# Patient Record
Sex: FEMALE | Race: ASIAN | NOT HISPANIC OR LATINO | Employment: OTHER | URBAN - METROPOLITAN AREA
[De-identification: names, ages, dates, MRNs, and addresses within clinical notes are randomized per-mention and may not be internally consistent; named-entity substitution may affect disease eponyms.]

---

## 2017-01-04 ENCOUNTER — AMBULATORY - HEALTHEAST (OUTPATIENT)
Dept: FAMILY MEDICINE | Facility: CLINIC | Age: 62
End: 2017-01-04

## 2017-01-04 DIAGNOSIS — E55.9 VITAMIN D DEFICIENCY: ICD-10-CM

## 2017-02-07 ENCOUNTER — COMMUNICATION - HEALTHEAST (OUTPATIENT)
Dept: FAMILY MEDICINE | Facility: CLINIC | Age: 62
End: 2017-02-07

## 2017-02-08 ENCOUNTER — AMBULATORY - HEALTHEAST (OUTPATIENT)
Dept: FAMILY MEDICINE | Facility: CLINIC | Age: 62
End: 2017-02-08

## 2017-02-13 ENCOUNTER — RECORDS - HEALTHEAST (OUTPATIENT)
Dept: ADMINISTRATIVE | Facility: OTHER | Age: 62
End: 2017-02-13

## 2017-02-27 ENCOUNTER — COMMUNICATION - HEALTHEAST (OUTPATIENT)
Dept: FAMILY MEDICINE | Facility: CLINIC | Age: 62
End: 2017-02-27

## 2017-03-14 ENCOUNTER — COMMUNICATION - HEALTHEAST (OUTPATIENT)
Dept: FAMILY MEDICINE | Facility: CLINIC | Age: 62
End: 2017-03-14

## 2017-03-14 DIAGNOSIS — Z00.00 PREVENTATIVE HEALTH CARE: ICD-10-CM

## 2017-09-03 ENCOUNTER — COMMUNICATION - HEALTHEAST (OUTPATIENT)
Dept: FAMILY MEDICINE | Facility: CLINIC | Age: 62
End: 2017-09-03

## 2017-09-03 DIAGNOSIS — E11.9 TYPE 2 DIABETES MELLITUS (H): ICD-10-CM

## 2017-09-05 ENCOUNTER — COMMUNICATION - HEALTHEAST (OUTPATIENT)
Dept: FAMILY MEDICINE | Facility: CLINIC | Age: 62
End: 2017-09-05

## 2017-09-14 ENCOUNTER — COMMUNICATION - HEALTHEAST (OUTPATIENT)
Dept: FAMILY MEDICINE | Facility: CLINIC | Age: 62
End: 2017-09-14

## 2017-09-14 ENCOUNTER — OFFICE VISIT - HEALTHEAST (OUTPATIENT)
Dept: FAMILY MEDICINE | Facility: CLINIC | Age: 62
End: 2017-09-14

## 2017-09-14 ENCOUNTER — AMBULATORY - HEALTHEAST (OUTPATIENT)
Dept: FAMILY MEDICINE | Facility: CLINIC | Age: 62
End: 2017-09-14

## 2017-09-14 DIAGNOSIS — Z79.4 TYPE 2 DIABETES MELLITUS TREATED WITH INSULIN (H): ICD-10-CM

## 2017-09-14 DIAGNOSIS — E11.9 TYPE 2 DIABETES MELLITUS TREATED WITH INSULIN (H): ICD-10-CM

## 2017-09-14 DIAGNOSIS — E55.9 VITAMIN D DEFICIENCY: ICD-10-CM

## 2017-09-14 DIAGNOSIS — Z23 NEED FOR INFLUENZA VACCINATION: ICD-10-CM

## 2017-09-14 DIAGNOSIS — Z00.00 PREVENTATIVE HEALTH CARE: ICD-10-CM

## 2017-09-14 DIAGNOSIS — M54.9 BACK PAIN: ICD-10-CM

## 2017-09-14 DIAGNOSIS — R05.9 COUGH: ICD-10-CM

## 2017-09-14 DIAGNOSIS — E78.5 HYPERLIPIDEMIA, UNSPECIFIED HYPERLIPIDEMIA TYPE: ICD-10-CM

## 2017-09-14 DIAGNOSIS — I83.90 VARICOSE VEIN OF LEG: ICD-10-CM

## 2017-09-14 LAB
CHOLEST SERPL-MCNC: 172 MG/DL
FASTING STATUS PATIENT QL REPORTED: YES
HBA1C MFR BLD: 8.6 % (ref 3.5–6)
HDLC SERPL-MCNC: 46 MG/DL
LDLC SERPL CALC-MCNC: 81 MG/DL
TRIGL SERPL-MCNC: 224 MG/DL

## 2017-09-14 ASSESSMENT — MIFFLIN-ST. JEOR: SCORE: 1112.77

## 2017-09-18 ENCOUNTER — COMMUNICATION - HEALTHEAST (OUTPATIENT)
Dept: FAMILY MEDICINE | Facility: CLINIC | Age: 62
End: 2017-09-18

## 2017-09-20 ENCOUNTER — AMBULATORY - HEALTHEAST (OUTPATIENT)
Dept: FAMILY MEDICINE | Facility: CLINIC | Age: 62
End: 2017-09-20

## 2017-10-11 ENCOUNTER — COMMUNICATION - HEALTHEAST (OUTPATIENT)
Dept: FAMILY MEDICINE | Facility: CLINIC | Age: 62
End: 2017-10-11

## 2017-10-11 DIAGNOSIS — R05.9 COUGH: ICD-10-CM

## 2017-10-25 ENCOUNTER — COMMUNICATION - HEALTHEAST (OUTPATIENT)
Dept: FAMILY MEDICINE | Facility: CLINIC | Age: 62
End: 2017-10-25

## 2017-10-25 DIAGNOSIS — E11.9 TYPE 2 DIABETES MELLITUS (H): ICD-10-CM

## 2017-10-26 RX ORDER — BLOOD-GLUCOSE METER
EACH MISCELLANEOUS
Qty: 1 EACH | Refills: 0 | Status: SHIPPED | OUTPATIENT
Start: 2017-10-26

## 2017-12-09 ENCOUNTER — COMMUNICATION - HEALTHEAST (OUTPATIENT)
Dept: FAMILY MEDICINE | Facility: CLINIC | Age: 62
End: 2017-12-09

## 2017-12-11 ENCOUNTER — AMBULATORY - HEALTHEAST (OUTPATIENT)
Dept: FAMILY MEDICINE | Facility: CLINIC | Age: 62
End: 2017-12-11

## 2017-12-11 DIAGNOSIS — R05.9 COUGH: ICD-10-CM

## 2018-03-15 ENCOUNTER — RECORDS - HEALTHEAST (OUTPATIENT)
Dept: ADMINISTRATIVE | Facility: OTHER | Age: 63
End: 2018-03-15

## 2018-03-15 ENCOUNTER — OFFICE VISIT - HEALTHEAST (OUTPATIENT)
Dept: FAMILY MEDICINE | Facility: CLINIC | Age: 63
End: 2018-03-15

## 2018-03-15 DIAGNOSIS — E55.9 VITAMIN D DEFICIENCY: ICD-10-CM

## 2018-03-15 DIAGNOSIS — R05.9 COUGH: ICD-10-CM

## 2018-03-15 DIAGNOSIS — E11.9 TYPE 2 DIABETES MELLITUS TREATED WITH INSULIN (H): ICD-10-CM

## 2018-03-15 DIAGNOSIS — Z79.4 TYPE 2 DIABETES MELLITUS TREATED WITH INSULIN (H): ICD-10-CM

## 2018-03-15 DIAGNOSIS — E78.5 HYPERLIPIDEMIA, UNSPECIFIED HYPERLIPIDEMIA TYPE: ICD-10-CM

## 2018-03-15 LAB
ALBUMIN SERPL-MCNC: 4 G/DL (ref 3.5–5)
ALP SERPL-CCNC: 107 U/L (ref 45–120)
ALT SERPL W P-5'-P-CCNC: 19 U/L (ref 0–45)
ANION GAP SERPL CALCULATED.3IONS-SCNC: 10 MMOL/L (ref 5–18)
AST SERPL W P-5'-P-CCNC: 26 U/L (ref 0–40)
BILIRUB SERPL-MCNC: 0.8 MG/DL (ref 0–1)
BUN SERPL-MCNC: 11 MG/DL (ref 8–22)
CALCIUM SERPL-MCNC: 9.6 MG/DL (ref 8.5–10.5)
CHLORIDE BLD-SCNC: 104 MMOL/L (ref 98–107)
CHOLEST SERPL-MCNC: 174 MG/DL
CO2 SERPL-SCNC: 27 MMOL/L (ref 22–31)
CREAT SERPL-MCNC: 0.58 MG/DL (ref 0.6–1.1)
FASTING STATUS PATIENT QL REPORTED: YES
GFR SERPL CREATININE-BSD FRML MDRD: >60 ML/MIN/1.73M2
GLUCOSE BLD-MCNC: 142 MG/DL (ref 70–125)
HBA1C MFR BLD: 8.1 % (ref 3.5–6)
HDLC SERPL-MCNC: 50 MG/DL
LDLC SERPL CALC-MCNC: 76 MG/DL
POTASSIUM BLD-SCNC: 4.6 MMOL/L (ref 3.5–5)
PROT SERPL-MCNC: 7.7 G/DL (ref 6–8)
SODIUM SERPL-SCNC: 141 MMOL/L (ref 136–145)
TRIGL SERPL-MCNC: 240 MG/DL

## 2018-03-15 ASSESSMENT — MIFFLIN-ST. JEOR: SCORE: 1103.7

## 2018-03-16 LAB — 25(OH)D3 SERPL-MCNC: 28.8 NG/ML (ref 30–80)

## 2018-03-28 ENCOUNTER — COMMUNICATION - HEALTHEAST (OUTPATIENT)
Dept: FAMILY MEDICINE | Facility: CLINIC | Age: 63
End: 2018-03-28

## 2018-05-16 ENCOUNTER — COMMUNICATION - HEALTHEAST (OUTPATIENT)
Dept: ADMINISTRATIVE | Facility: CLINIC | Age: 63
End: 2018-05-16

## 2018-06-28 ENCOUNTER — COMMUNICATION - HEALTHEAST (OUTPATIENT)
Dept: FAMILY MEDICINE | Facility: CLINIC | Age: 63
End: 2018-06-28

## 2018-06-28 DIAGNOSIS — R05.9 COUGH: ICD-10-CM

## 2018-07-29 ENCOUNTER — COMMUNICATION - HEALTHEAST (OUTPATIENT)
Dept: FAMILY MEDICINE | Facility: CLINIC | Age: 63
End: 2018-07-29

## 2018-07-29 DIAGNOSIS — E11.9 TYPE 2 DIABETES MELLITUS TREATED WITH INSULIN (H): ICD-10-CM

## 2018-07-29 DIAGNOSIS — Z79.4 TYPE 2 DIABETES MELLITUS TREATED WITH INSULIN (H): ICD-10-CM

## 2018-08-06 ENCOUNTER — OFFICE VISIT - HEALTHEAST (OUTPATIENT)
Dept: PHARMACY | Facility: CLINIC | Age: 63
End: 2018-08-06

## 2018-08-06 DIAGNOSIS — Z79.4 TYPE 2 DIABETES MELLITUS TREATED WITH INSULIN (H): ICD-10-CM

## 2018-08-06 DIAGNOSIS — E55.9 VITAMIN D DEFICIENCY: ICD-10-CM

## 2018-08-06 DIAGNOSIS — Z00.00 PREVENTATIVE HEALTH CARE: ICD-10-CM

## 2018-08-06 DIAGNOSIS — Z71.89 ENCOUNTER FOR HERB AND VITAMIN SUPPLEMENT MANAGEMENT: ICD-10-CM

## 2018-08-06 DIAGNOSIS — E78.5 HYPERLIPIDEMIA, UNSPECIFIED HYPERLIPIDEMIA TYPE: ICD-10-CM

## 2018-08-06 DIAGNOSIS — E11.9 TYPE 2 DIABETES MELLITUS TREATED WITH INSULIN (H): ICD-10-CM

## 2018-08-06 DIAGNOSIS — R05.9 COUGH: ICD-10-CM

## 2018-08-06 RX ORDER — AMOXICILLIN 250 MG
1 CAPSULE ORAL DAILY
Qty: 90 EACH | Refills: 3 | Status: SHIPPED | OUTPATIENT
Start: 2018-08-06

## 2018-08-15 ENCOUNTER — COMMUNICATION - HEALTHEAST (OUTPATIENT)
Dept: FAMILY MEDICINE | Facility: CLINIC | Age: 63
End: 2018-08-15

## 2018-09-10 ENCOUNTER — COMMUNICATION - HEALTHEAST (OUTPATIENT)
Dept: FAMILY MEDICINE | Facility: CLINIC | Age: 63
End: 2018-09-10

## 2018-09-10 DIAGNOSIS — Z00.00 PREVENTATIVE HEALTH CARE: ICD-10-CM

## 2018-11-04 ENCOUNTER — COMMUNICATION - HEALTHEAST (OUTPATIENT)
Dept: FAMILY MEDICINE | Facility: CLINIC | Age: 63
End: 2018-11-04

## 2018-11-04 DIAGNOSIS — R05.9 COUGH: ICD-10-CM

## 2018-11-14 ENCOUNTER — OFFICE VISIT - HEALTHEAST (OUTPATIENT)
Dept: FAMILY MEDICINE | Facility: CLINIC | Age: 63
End: 2018-11-14

## 2018-11-14 ENCOUNTER — RECORDS - HEALTHEAST (OUTPATIENT)
Dept: MAMMOGRAPHY | Facility: CLINIC | Age: 63
End: 2018-11-14

## 2018-11-14 DIAGNOSIS — R05.9 COUGH: ICD-10-CM

## 2018-11-14 DIAGNOSIS — Z12.31 ENCOUNTER FOR SCREENING MAMMOGRAM FOR MALIGNANT NEOPLASM OF BREAST: ICD-10-CM

## 2018-11-14 DIAGNOSIS — E11.9 TYPE 2 DIABETES MELLITUS TREATED WITH INSULIN (H): ICD-10-CM

## 2018-11-14 DIAGNOSIS — Z79.4 TYPE 2 DIABETES MELLITUS TREATED WITH INSULIN (H): ICD-10-CM

## 2018-11-14 DIAGNOSIS — E78.5 HYPERLIPIDEMIA, UNSPECIFIED HYPERLIPIDEMIA TYPE: ICD-10-CM

## 2018-11-14 DIAGNOSIS — Z23 NEED FOR INFLUENZA VACCINATION: ICD-10-CM

## 2018-11-14 DIAGNOSIS — R10.12 LEFT UPPER QUADRANT PAIN: ICD-10-CM

## 2018-11-14 LAB
ALBUMIN SERPL-MCNC: 3.7 G/DL (ref 3.5–5)
ALP SERPL-CCNC: 96 U/L (ref 45–120)
ALT SERPL W P-5'-P-CCNC: 24 U/L (ref 0–45)
ANION GAP SERPL CALCULATED.3IONS-SCNC: 10 MMOL/L (ref 5–18)
AST SERPL W P-5'-P-CCNC: 31 U/L (ref 0–40)
BILIRUB SERPL-MCNC: 0.6 MG/DL (ref 0–1)
BUN SERPL-MCNC: 11 MG/DL (ref 8–22)
CALCIUM SERPL-MCNC: 9.3 MG/DL (ref 8.5–10.5)
CHLORIDE BLD-SCNC: 104 MMOL/L (ref 98–107)
CHOLEST SERPL-MCNC: 163 MG/DL
CO2 SERPL-SCNC: 26 MMOL/L (ref 22–31)
CREAT SERPL-MCNC: 0.57 MG/DL (ref 0.6–1.1)
CREAT UR-MCNC: 53.4 MG/DL
FASTING STATUS PATIENT QL REPORTED: YES
GFR SERPL CREATININE-BSD FRML MDRD: >60 ML/MIN/1.73M2
GLUCOSE BLD-MCNC: 139 MG/DL (ref 70–125)
HBA1C MFR BLD: 8.1 % (ref 3.5–6)
HDLC SERPL-MCNC: 48 MG/DL
LDLC SERPL CALC-MCNC: 79 MG/DL
MICROALBUMIN UR-MCNC: 22.48 MG/DL (ref 0–1.99)
MICROALBUMIN/CREAT UR: 421 MG/G
POTASSIUM BLD-SCNC: 4.3 MMOL/L (ref 3.5–5)
PROT SERPL-MCNC: 7.1 G/DL (ref 6–8)
SODIUM SERPL-SCNC: 140 MMOL/L (ref 136–145)
TRIGL SERPL-MCNC: 181 MG/DL

## 2018-11-14 ASSESSMENT — MIFFLIN-ST. JEOR: SCORE: 1120.99

## 2018-11-23 ENCOUNTER — HOSPITAL ENCOUNTER (OUTPATIENT)
Dept: ULTRASOUND IMAGING | Facility: HOSPITAL | Age: 63
Discharge: HOME | End: 2018-11-23
Attending: FAMILY MEDICINE

## 2018-11-23 DIAGNOSIS — R10.12 LEFT UPPER QUADRANT PAIN: ICD-10-CM

## 2018-11-28 ENCOUNTER — COMMUNICATION - HEALTHEAST (OUTPATIENT)
Dept: FAMILY MEDICINE | Facility: CLINIC | Age: 63
End: 2018-11-28

## 2018-11-28 DIAGNOSIS — R05.9 COUGH: ICD-10-CM

## 2018-12-19 ENCOUNTER — HOSPITAL ENCOUNTER (OUTPATIENT)
Dept: MAMMOGRAPHY | Facility: CLINIC | Age: 63
Discharge: HOME | End: 2018-12-19
Attending: FAMILY MEDICINE

## 2018-12-19 ENCOUNTER — OFFICE VISIT - HEALTHEAST (OUTPATIENT)
Dept: FAMILY MEDICINE | Facility: CLINIC | Age: 63
End: 2018-12-19

## 2018-12-19 DIAGNOSIS — Z79.4 TYPE 2 DIABETES MELLITUS TREATED WITH INSULIN (H): ICD-10-CM

## 2018-12-19 DIAGNOSIS — N64.89 BREAST ASYMMETRY: ICD-10-CM

## 2018-12-19 DIAGNOSIS — E78.5 HYPERLIPIDEMIA, UNSPECIFIED HYPERLIPIDEMIA TYPE: ICD-10-CM

## 2018-12-19 DIAGNOSIS — E11.9 TYPE 2 DIABETES MELLITUS TREATED WITH INSULIN (H): ICD-10-CM

## 2018-12-19 DIAGNOSIS — R05.9 COUGH: ICD-10-CM

## 2018-12-19 DIAGNOSIS — R10.84 ABDOMINAL PAIN, GENERALIZED: ICD-10-CM

## 2018-12-19 ASSESSMENT — MIFFLIN-ST. JEOR: SCORE: 1116.46

## 2019-02-19 ENCOUNTER — OFFICE VISIT - HEALTHEAST (OUTPATIENT)
Dept: FAMILY MEDICINE | Facility: CLINIC | Age: 64
End: 2019-02-19

## 2019-02-19 DIAGNOSIS — R05.9 COUGH: ICD-10-CM

## 2019-02-19 DIAGNOSIS — Z00.00 PREVENTATIVE HEALTH CARE: ICD-10-CM

## 2019-02-19 DIAGNOSIS — Z79.4 TYPE 2 DIABETES MELLITUS TREATED WITH INSULIN (H): ICD-10-CM

## 2019-02-19 DIAGNOSIS — E11.9 TYPE 2 DIABETES MELLITUS TREATED WITH INSULIN (H): ICD-10-CM

## 2019-02-19 DIAGNOSIS — E78.5 HYPERLIPIDEMIA, UNSPECIFIED HYPERLIPIDEMIA TYPE: ICD-10-CM

## 2019-02-19 LAB
ALBUMIN SERPL-MCNC: 3.8 G/DL (ref 3.5–5)
ALP SERPL-CCNC: 98 U/L (ref 45–120)
ALT SERPL W P-5'-P-CCNC: 21 U/L (ref 0–45)
ANION GAP SERPL CALCULATED.3IONS-SCNC: 9 MMOL/L (ref 5–18)
AST SERPL W P-5'-P-CCNC: 24 U/L (ref 0–40)
BILIRUB SERPL-MCNC: 0.3 MG/DL (ref 0–1)
BUN SERPL-MCNC: 12 MG/DL (ref 8–22)
CALCIUM SERPL-MCNC: 9 MG/DL (ref 8.5–10.5)
CHLORIDE BLD-SCNC: 106 MMOL/L (ref 98–107)
CHOLEST SERPL-MCNC: 163 MG/DL
CO2 SERPL-SCNC: 25 MMOL/L (ref 22–31)
CREAT SERPL-MCNC: 0.6 MG/DL (ref 0.6–1.1)
FASTING STATUS PATIENT QL REPORTED: NO
GFR SERPL CREATININE-BSD FRML MDRD: >60 ML/MIN/1.73M2
GLUCOSE BLD-MCNC: 146 MG/DL (ref 70–125)
HBA1C MFR BLD: 7.7 % (ref 3.5–6)
HDLC SERPL-MCNC: 40 MG/DL
LDLC SERPL CALC-MCNC: 46 MG/DL
POTASSIUM BLD-SCNC: 3.7 MMOL/L (ref 3.5–5)
PROT SERPL-MCNC: 7.2 G/DL (ref 6–8)
SODIUM SERPL-SCNC: 140 MMOL/L (ref 136–145)
TRIGL SERPL-MCNC: 385 MG/DL

## 2019-02-19 RX ORDER — BENZONATATE 200 MG/1
200 CAPSULE ORAL 3 TIMES DAILY PRN
Qty: 30 CAPSULE | Refills: 0 | Status: SHIPPED | OUTPATIENT
Start: 2019-02-19 | End: 2021-11-16

## 2019-02-19 ASSESSMENT — MIFFLIN-ST. JEOR: SCORE: 1118.16

## 2019-02-21 ENCOUNTER — COMMUNICATION - HEALTHEAST (OUTPATIENT)
Dept: FAMILY MEDICINE | Facility: CLINIC | Age: 64
End: 2019-02-21

## 2019-04-15 ENCOUNTER — OFFICE VISIT - HEALTHEAST (OUTPATIENT)
Dept: FAMILY MEDICINE | Facility: CLINIC | Age: 64
End: 2019-04-15

## 2019-04-15 DIAGNOSIS — S80.02XA CONTUSION OF LEFT KNEE, INITIAL ENCOUNTER: ICD-10-CM

## 2019-04-15 ASSESSMENT — MIFFLIN-ST. JEOR: SCORE: 1120.14

## 2019-05-19 ENCOUNTER — COMMUNICATION - HEALTHEAST (OUTPATIENT)
Dept: FAMILY MEDICINE | Facility: CLINIC | Age: 64
End: 2019-05-19

## 2019-05-19 DIAGNOSIS — E11.9 TYPE 2 DIABETES MELLITUS TREATED WITH INSULIN (H): ICD-10-CM

## 2019-05-19 DIAGNOSIS — Z79.4 TYPE 2 DIABETES MELLITUS TREATED WITH INSULIN (H): ICD-10-CM

## 2019-05-21 ENCOUNTER — OFFICE VISIT - HEALTHEAST (OUTPATIENT)
Dept: FAMILY MEDICINE | Facility: CLINIC | Age: 64
End: 2019-05-21

## 2019-05-21 ENCOUNTER — COMMUNICATION - HEALTHEAST (OUTPATIENT)
Dept: FAMILY MEDICINE | Facility: CLINIC | Age: 64
End: 2019-05-21

## 2019-05-21 DIAGNOSIS — R10.32 LLQ ABDOMINAL PAIN: ICD-10-CM

## 2019-05-21 DIAGNOSIS — E78.5 HYPERLIPIDEMIA, UNSPECIFIED HYPERLIPIDEMIA TYPE: ICD-10-CM

## 2019-05-21 DIAGNOSIS — M25.562 ACUTE PAIN OF LEFT KNEE: ICD-10-CM

## 2019-05-21 DIAGNOSIS — E11.9 TYPE 2 DIABETES MELLITUS TREATED WITH INSULIN (H): ICD-10-CM

## 2019-05-21 DIAGNOSIS — Z79.4 TYPE 2 DIABETES MELLITUS TREATED WITH INSULIN (H): ICD-10-CM

## 2019-05-21 LAB — HBA1C MFR BLD: 8.1 % (ref 3.5–6)

## 2019-05-21 RX ORDER — PEN NEEDLE, DIABETIC 32GX 5/32"
NEEDLE, DISPOSABLE MISCELLANEOUS
Qty: 100 EACH | Refills: 2 | Status: SHIPPED | OUTPATIENT
Start: 2019-05-21 | End: 2024-07-01

## 2019-05-21 RX ORDER — GLUCOSAMINE HCL/CHONDROITIN SU 500-400 MG
CAPSULE ORAL
Qty: 200 STRIP | Refills: 3 | Status: SHIPPED | OUTPATIENT
Start: 2019-05-21 | End: 2024-07-01

## 2019-05-21 ASSESSMENT — MIFFLIN-ST. JEOR: SCORE: 1111.07

## 2019-05-22 LAB
ALBUMIN SERPL-MCNC: 4.2 G/DL (ref 3.5–5)
ALP SERPL-CCNC: 111 U/L (ref 45–120)
ALT SERPL W P-5'-P-CCNC: 29 U/L (ref 0–45)
ANION GAP SERPL CALCULATED.3IONS-SCNC: 12 MMOL/L (ref 5–18)
AST SERPL W P-5'-P-CCNC: 40 U/L (ref 0–40)
BILIRUB SERPL-MCNC: 0.2 MG/DL (ref 0–1)
BUN SERPL-MCNC: 17 MG/DL (ref 8–22)
CALCIUM SERPL-MCNC: 9.7 MG/DL (ref 8.5–10.5)
CHLORIDE BLD-SCNC: 105 MMOL/L (ref 98–107)
CHOLEST SERPL-MCNC: 204 MG/DL
CO2 SERPL-SCNC: 22 MMOL/L (ref 22–31)
CREAT SERPL-MCNC: 0.67 MG/DL (ref 0.6–1.1)
FASTING STATUS PATIENT QL REPORTED: ABNORMAL
GFR SERPL CREATININE-BSD FRML MDRD: >60 ML/MIN/1.73M2
GLUCOSE BLD-MCNC: 191 MG/DL (ref 70–125)
HDLC SERPL-MCNC: 49 MG/DL
LDLC SERPL CALC-MCNC: 101 MG/DL
LDLC SERPL CALC-MCNC: ABNORMAL MG/DL
POTASSIUM BLD-SCNC: 4.1 MMOL/L (ref 3.5–5)
PROT SERPL-MCNC: 7.8 G/DL (ref 6–8)
SODIUM SERPL-SCNC: 139 MMOL/L (ref 136–145)
TRIGL SERPL-MCNC: 469 MG/DL

## 2019-06-10 ENCOUNTER — HOSPITAL ENCOUNTER (OUTPATIENT)
Dept: CT IMAGING | Facility: HOSPITAL | Age: 64
Discharge: HOME | End: 2019-06-10
Attending: FAMILY MEDICINE

## 2019-06-10 DIAGNOSIS — R10.32 LLQ ABDOMINAL PAIN: ICD-10-CM

## 2019-06-11 ENCOUNTER — COMMUNICATION - HEALTHEAST (OUTPATIENT)
Dept: FAMILY MEDICINE | Facility: CLINIC | Age: 64
End: 2019-06-11

## 2019-07-16 ENCOUNTER — RECORDS - HEALTHEAST (OUTPATIENT)
Dept: ADMINISTRATIVE | Facility: OTHER | Age: 64
End: 2019-07-16

## 2019-07-22 ENCOUNTER — COMMUNICATION - HEALTHEAST (OUTPATIENT)
Dept: FAMILY MEDICINE | Facility: CLINIC | Age: 64
End: 2019-07-22

## 2019-07-26 ENCOUNTER — COMMUNICATION - HEALTHEAST (OUTPATIENT)
Dept: FAMILY MEDICINE | Facility: CLINIC | Age: 64
End: 2019-07-26

## 2019-07-26 DIAGNOSIS — E11.9 TYPE 2 DIABETES MELLITUS TREATED WITH INSULIN (H): ICD-10-CM

## 2019-07-26 DIAGNOSIS — Z79.4 TYPE 2 DIABETES MELLITUS TREATED WITH INSULIN (H): ICD-10-CM

## 2019-07-29 ENCOUNTER — AMBULATORY - HEALTHEAST (OUTPATIENT)
Dept: FAMILY MEDICINE | Facility: CLINIC | Age: 64
End: 2019-07-29

## 2019-07-29 DIAGNOSIS — Z79.4 TYPE 2 DIABETES MELLITUS TREATED WITH INSULIN (H): ICD-10-CM

## 2019-07-29 DIAGNOSIS — E11.9 TYPE 2 DIABETES MELLITUS TREATED WITH INSULIN (H): ICD-10-CM

## 2019-07-30 ENCOUNTER — COMMUNICATION - HEALTHEAST (OUTPATIENT)
Dept: FAMILY MEDICINE | Facility: CLINIC | Age: 64
End: 2019-07-30

## 2019-08-16 ENCOUNTER — OFFICE VISIT - HEALTHEAST (OUTPATIENT)
Dept: FAMILY MEDICINE | Facility: CLINIC | Age: 64
End: 2019-08-16

## 2019-08-16 DIAGNOSIS — E78.5 HYPERLIPIDEMIA, UNSPECIFIED HYPERLIPIDEMIA TYPE: ICD-10-CM

## 2019-08-16 DIAGNOSIS — R10.32 LLQ ABDOMINAL PAIN: ICD-10-CM

## 2019-08-16 DIAGNOSIS — Z79.4 TYPE 2 DIABETES MELLITUS TREATED WITH INSULIN (H): ICD-10-CM

## 2019-08-16 DIAGNOSIS — E11.9 TYPE 2 DIABETES MELLITUS TREATED WITH INSULIN (H): ICD-10-CM

## 2019-08-16 DIAGNOSIS — M25.562 LEFT KNEE PAIN, UNSPECIFIED CHRONICITY: ICD-10-CM

## 2019-08-16 LAB — HBA1C MFR BLD: 8.2 % (ref 3.5–6)

## 2019-08-16 ASSESSMENT — MIFFLIN-ST. JEOR: SCORE: 1106.54

## 2019-09-04 ENCOUNTER — COMMUNICATION - HEALTHEAST (OUTPATIENT)
Dept: FAMILY MEDICINE | Facility: CLINIC | Age: 64
End: 2019-09-04

## 2019-09-09 ENCOUNTER — COMMUNICATION - HEALTHEAST (OUTPATIENT)
Dept: FAMILY MEDICINE | Facility: CLINIC | Age: 64
End: 2019-09-09

## 2019-10-04 ENCOUNTER — RECORDS - HEALTHEAST (OUTPATIENT)
Dept: ADMINISTRATIVE | Facility: OTHER | Age: 64
End: 2019-10-04

## 2019-10-08 ENCOUNTER — RECORDS - HEALTHEAST (OUTPATIENT)
Dept: HEALTH INFORMATION MANAGEMENT | Facility: CLINIC | Age: 64
End: 2019-10-08

## 2019-11-21 ENCOUNTER — OFFICE VISIT - HEALTHEAST (OUTPATIENT)
Dept: FAMILY MEDICINE | Facility: CLINIC | Age: 64
End: 2019-11-21

## 2019-11-21 DIAGNOSIS — M25.562 LEFT KNEE PAIN, UNSPECIFIED CHRONICITY: ICD-10-CM

## 2019-11-21 DIAGNOSIS — Z23 IMMUNIZATION DUE: ICD-10-CM

## 2019-11-21 DIAGNOSIS — Z00.00 ROUTINE GENERAL MEDICAL EXAMINATION AT A HEALTH CARE FACILITY: ICD-10-CM

## 2019-11-21 DIAGNOSIS — Z11.59 SCREENING FOR VIRAL DISEASE: ICD-10-CM

## 2019-11-21 DIAGNOSIS — Z79.4 TYPE 2 DIABETES MELLITUS TREATED WITH INSULIN (H): ICD-10-CM

## 2019-11-21 DIAGNOSIS — E11.9 TYPE 2 DIABETES MELLITUS TREATED WITH INSULIN (H): ICD-10-CM

## 2019-11-21 DIAGNOSIS — E78.5 HYPERLIPIDEMIA, UNSPECIFIED HYPERLIPIDEMIA TYPE: ICD-10-CM

## 2019-11-21 LAB
CHOLEST SERPL-MCNC: 147 MG/DL
CREAT UR-MCNC: 72.4 MG/DL
FASTING STATUS PATIENT QL REPORTED: YES
HBA1C MFR BLD: 7.4 % (ref 3.5–6)
HDLC SERPL-MCNC: 49 MG/DL
HIV 1+2 AB+HIV1 P24 AG SERPL QL IA: NEGATIVE
LDLC SERPL CALC-MCNC: 66 MG/DL
MICROALBUMIN UR-MCNC: 28.54 MG/DL (ref 0–1.99)
MICROALBUMIN/CREAT UR: 394.2 MG/G
TRIGL SERPL-MCNC: 162 MG/DL

## 2019-11-21 ASSESSMENT — MIFFLIN-ST. JEOR: SCORE: 1108.87

## 2019-11-22 LAB — HCV AB SERPL QL IA: NEGATIVE

## 2020-02-16 ENCOUNTER — COMMUNICATION - HEALTHEAST (OUTPATIENT)
Dept: FAMILY MEDICINE | Facility: CLINIC | Age: 65
End: 2020-02-16

## 2020-02-16 DIAGNOSIS — Z79.4 TYPE 2 DIABETES MELLITUS TREATED WITH INSULIN (H): ICD-10-CM

## 2020-02-16 DIAGNOSIS — E11.9 TYPE 2 DIABETES MELLITUS TREATED WITH INSULIN (H): ICD-10-CM

## 2020-02-18 ENCOUNTER — COMMUNICATION - HEALTHEAST (OUTPATIENT)
Dept: FAMILY MEDICINE | Facility: CLINIC | Age: 65
End: 2020-02-18

## 2020-02-20 ENCOUNTER — AMBULATORY - HEALTHEAST (OUTPATIENT)
Dept: FAMILY MEDICINE | Facility: CLINIC | Age: 65
End: 2020-02-20

## 2020-02-20 DIAGNOSIS — Z79.4 TYPE 2 DIABETES MELLITUS TREATED WITH INSULIN (H): ICD-10-CM

## 2020-02-20 DIAGNOSIS — E11.9 TYPE 2 DIABETES MELLITUS TREATED WITH INSULIN (H): ICD-10-CM

## 2020-02-27 ENCOUNTER — OFFICE VISIT - HEALTHEAST (OUTPATIENT)
Dept: FAMILY MEDICINE | Facility: CLINIC | Age: 65
End: 2020-02-27

## 2020-02-27 DIAGNOSIS — E55.9 VITAMIN D DEFICIENCY: ICD-10-CM

## 2020-02-27 DIAGNOSIS — E78.5 HYPERLIPIDEMIA, UNSPECIFIED HYPERLIPIDEMIA TYPE: ICD-10-CM

## 2020-02-27 DIAGNOSIS — Z79.4 TYPE 2 DIABETES MELLITUS TREATED WITH INSULIN (H): ICD-10-CM

## 2020-02-27 DIAGNOSIS — E11.9 TYPE 2 DIABETES MELLITUS TREATED WITH INSULIN (H): ICD-10-CM

## 2020-02-27 LAB
ALBUMIN SERPL-MCNC: 3.8 G/DL (ref 3.5–5)
ALP SERPL-CCNC: 103 U/L (ref 45–120)
ALT SERPL W P-5'-P-CCNC: 17 U/L (ref 0–45)
ANION GAP SERPL CALCULATED.3IONS-SCNC: 11 MMOL/L (ref 5–18)
AST SERPL W P-5'-P-CCNC: 18 U/L (ref 0–40)
BILIRUB SERPL-MCNC: 0.3 MG/DL (ref 0–1)
BUN SERPL-MCNC: 12 MG/DL (ref 8–22)
CALCIUM SERPL-MCNC: 9.4 MG/DL (ref 8.5–10.5)
CHLORIDE BLD-SCNC: 105 MMOL/L (ref 98–107)
CO2 SERPL-SCNC: 24 MMOL/L (ref 22–31)
CREAT SERPL-MCNC: 0.64 MG/DL (ref 0.6–1.1)
GFR SERPL CREATININE-BSD FRML MDRD: >60 ML/MIN/1.73M2
GLUCOSE BLD-MCNC: 179 MG/DL (ref 70–125)
HBA1C MFR BLD: 7.4 % (ref 3.5–6)
POTASSIUM BLD-SCNC: 4 MMOL/L (ref 3.5–5)
PROT SERPL-MCNC: 7 G/DL (ref 6–8)
SODIUM SERPL-SCNC: 140 MMOL/L (ref 136–145)
TSH SERPL DL<=0.005 MIU/L-ACNC: 0.92 UIU/ML (ref 0.3–5)

## 2020-02-27 ASSESSMENT — MIFFLIN-ST. JEOR: SCORE: 1117.6

## 2020-02-28 ENCOUNTER — COMMUNICATION - HEALTHEAST (OUTPATIENT)
Dept: FAMILY MEDICINE | Facility: CLINIC | Age: 65
End: 2020-02-28

## 2020-02-28 LAB — 25(OH)D3 SERPL-MCNC: 13.3 NG/ML (ref 30–80)

## 2020-03-29 ENCOUNTER — COMMUNICATION - HEALTHEAST (OUTPATIENT)
Dept: FAMILY MEDICINE | Facility: CLINIC | Age: 65
End: 2020-03-29

## 2020-03-29 DIAGNOSIS — Z79.4 TYPE 2 DIABETES MELLITUS TREATED WITH INSULIN (H): ICD-10-CM

## 2020-03-29 DIAGNOSIS — E11.9 TYPE 2 DIABETES MELLITUS TREATED WITH INSULIN (H): ICD-10-CM

## 2020-05-03 ENCOUNTER — COMMUNICATION - HEALTHEAST (OUTPATIENT)
Dept: FAMILY MEDICINE | Facility: CLINIC | Age: 65
End: 2020-05-03

## 2020-05-03 DIAGNOSIS — Z79.4 TYPE 2 DIABETES MELLITUS TREATED WITH INSULIN (H): ICD-10-CM

## 2020-05-03 DIAGNOSIS — E11.9 TYPE 2 DIABETES MELLITUS TREATED WITH INSULIN (H): ICD-10-CM

## 2020-06-02 ENCOUNTER — COMMUNICATION - HEALTHEAST (OUTPATIENT)
Dept: FAMILY MEDICINE | Facility: CLINIC | Age: 65
End: 2020-06-02

## 2020-06-02 DIAGNOSIS — Z79.4 TYPE 2 DIABETES MELLITUS TREATED WITH INSULIN (H): ICD-10-CM

## 2020-06-02 DIAGNOSIS — E11.9 TYPE 2 DIABETES MELLITUS TREATED WITH INSULIN (H): ICD-10-CM

## 2020-08-29 ENCOUNTER — COMMUNICATION - HEALTHEAST (OUTPATIENT)
Dept: FAMILY MEDICINE | Facility: CLINIC | Age: 65
End: 2020-08-29

## 2020-08-29 DIAGNOSIS — E11.9 TYPE 2 DIABETES MELLITUS TREATED WITH INSULIN (H): ICD-10-CM

## 2020-08-29 DIAGNOSIS — Z79.4 TYPE 2 DIABETES MELLITUS TREATED WITH INSULIN (H): ICD-10-CM

## 2020-09-28 ENCOUNTER — COMMUNICATION - HEALTHEAST (OUTPATIENT)
Dept: FAMILY MEDICINE | Facility: CLINIC | Age: 65
End: 2020-09-28

## 2020-09-28 DIAGNOSIS — E11.9 TYPE 2 DIABETES MELLITUS TREATED WITH INSULIN (H): ICD-10-CM

## 2020-09-28 DIAGNOSIS — Z79.4 TYPE 2 DIABETES MELLITUS TREATED WITH INSULIN (H): ICD-10-CM

## 2020-10-01 RX ORDER — BLOOD SUGAR DIAGNOSTIC
STRIP MISCELLANEOUS
Qty: 200 STRIP | Refills: 11 | Status: SHIPPED | OUTPATIENT
Start: 2020-10-01

## 2020-10-13 ENCOUNTER — OFFICE VISIT - HEALTHEAST (OUTPATIENT)
Dept: FAMILY MEDICINE | Facility: CLINIC | Age: 65
End: 2020-10-13

## 2020-10-13 DIAGNOSIS — E11.9 TYPE 2 DIABETES MELLITUS TREATED WITH INSULIN (H): ICD-10-CM

## 2020-10-13 DIAGNOSIS — E78.5 HYPERLIPIDEMIA, UNSPECIFIED HYPERLIPIDEMIA TYPE: ICD-10-CM

## 2020-10-13 DIAGNOSIS — M25.562 LEFT KNEE PAIN, UNSPECIFIED CHRONICITY: ICD-10-CM

## 2020-10-13 DIAGNOSIS — Z23 NEED FOR INFLUENZA VACCINATION: ICD-10-CM

## 2020-10-13 DIAGNOSIS — Z79.4 TYPE 2 DIABETES MELLITUS TREATED WITH INSULIN (H): ICD-10-CM

## 2020-10-13 LAB
ALBUMIN SERPL-MCNC: 4.4 G/DL (ref 3.5–5)
ALP SERPL-CCNC: 93 U/L (ref 45–120)
ALT SERPL W P-5'-P-CCNC: 16 U/L (ref 0–45)
ANION GAP SERPL CALCULATED.3IONS-SCNC: 11 MMOL/L (ref 5–18)
AST SERPL W P-5'-P-CCNC: 21 U/L (ref 0–40)
BILIRUB SERPL-MCNC: 0.6 MG/DL (ref 0–1)
BUN SERPL-MCNC: 10 MG/DL (ref 8–22)
CALCIUM SERPL-MCNC: 9.2 MG/DL (ref 8.5–10.5)
CHLORIDE BLD-SCNC: 105 MMOL/L (ref 98–107)
CHOLEST SERPL-MCNC: 181 MG/DL
CO2 SERPL-SCNC: 24 MMOL/L (ref 22–31)
CREAT SERPL-MCNC: 0.6 MG/DL (ref 0.6–1.1)
CREAT UR-MCNC: 54.3 MG/DL
FASTING STATUS PATIENT QL REPORTED: YES
GFR SERPL CREATININE-BSD FRML MDRD: >60 ML/MIN/1.73M2
GLUCOSE BLD-MCNC: 94 MG/DL (ref 70–125)
HBA1C MFR BLD: 7.4 %
HDLC SERPL-MCNC: 50 MG/DL
LDLC SERPL CALC-MCNC: 68 MG/DL
MICROALBUMIN UR-MCNC: 33.48 MG/DL (ref 0–1.99)
MICROALBUMIN/CREAT UR: 616.6 MG/G
POTASSIUM BLD-SCNC: 4.1 MMOL/L (ref 3.5–5)
PROT SERPL-MCNC: 7.6 G/DL (ref 6–8)
SODIUM SERPL-SCNC: 140 MMOL/L (ref 136–145)
TRIGL SERPL-MCNC: 317 MG/DL

## 2020-10-13 RX ORDER — SIMVASTATIN 20 MG
20 TABLET ORAL AT BEDTIME
Qty: 90 TABLET | Refills: 1 | Status: SHIPPED | OUTPATIENT
Start: 2020-10-13 | End: 2021-11-16

## 2020-10-13 RX ORDER — GLIPIZIDE 10 MG/1
10 TABLET, FILM COATED, EXTENDED RELEASE ORAL DAILY
Qty: 90 TABLET | Refills: 3 | Status: SHIPPED | OUTPATIENT
Start: 2020-10-13 | End: 2021-11-16

## 2020-10-13 ASSESSMENT — MIFFLIN-ST. JEOR: SCORE: 1092.66

## 2020-10-14 LAB — HBV SURFACE AB SERPL IA-ACNC: NEGATIVE M[IU]/ML

## 2020-10-15 ENCOUNTER — AMBULATORY - HEALTHEAST (OUTPATIENT)
Dept: FAMILY MEDICINE | Facility: CLINIC | Age: 65
End: 2020-10-15

## 2020-10-15 DIAGNOSIS — Z78.9 NOT IMMUNE TO HEPATITIS B VIRUS: ICD-10-CM

## 2020-11-03 ENCOUNTER — AMBULATORY - HEALTHEAST (OUTPATIENT)
Dept: NURSING | Facility: CLINIC | Age: 65
End: 2020-11-03

## 2020-11-03 DIAGNOSIS — Z78.9 NOT IMMUNE TO HEPATITIS B VIRUS: ICD-10-CM

## 2020-11-26 ENCOUNTER — COMMUNICATION - HEALTHEAST (OUTPATIENT)
Dept: FAMILY MEDICINE | Facility: CLINIC | Age: 65
End: 2020-11-26

## 2020-11-26 DIAGNOSIS — E11.9 TYPE 2 DIABETES MELLITUS TREATED WITH INSULIN (H): ICD-10-CM

## 2020-11-26 DIAGNOSIS — Z79.4 TYPE 2 DIABETES MELLITUS TREATED WITH INSULIN (H): ICD-10-CM

## 2020-11-26 RX ORDER — LANCETS
EACH MISCELLANEOUS
Qty: 300 EACH | Refills: 2 | Status: SHIPPED | OUTPATIENT
Start: 2020-11-26 | End: 2022-06-26

## 2020-12-07 ENCOUNTER — RECORDS - HEALTHEAST (OUTPATIENT)
Dept: ADMINISTRATIVE | Facility: OTHER | Age: 65
End: 2020-12-07

## 2020-12-07 LAB — RETINOPATHY: NEGATIVE

## 2020-12-09 ENCOUNTER — RECORDS - HEALTHEAST (OUTPATIENT)
Dept: HEALTH INFORMATION MANAGEMENT | Facility: CLINIC | Age: 65
End: 2020-12-09

## 2020-12-24 ENCOUNTER — OFFICE VISIT - HEALTHEAST (OUTPATIENT)
Dept: FAMILY MEDICINE | Facility: CLINIC | Age: 65
End: 2020-12-24

## 2020-12-24 DIAGNOSIS — E11.9 TYPE 2 DIABETES MELLITUS TREATED WITH INSULIN (H): ICD-10-CM

## 2020-12-24 DIAGNOSIS — Z23 IMMUNIZATION DUE: ICD-10-CM

## 2020-12-24 DIAGNOSIS — Z12.4 SCREENING FOR CERVICAL CANCER: ICD-10-CM

## 2020-12-24 DIAGNOSIS — E78.5 HYPERLIPIDEMIA, UNSPECIFIED HYPERLIPIDEMIA TYPE: ICD-10-CM

## 2020-12-24 DIAGNOSIS — Z79.4 TYPE 2 DIABETES MELLITUS TREATED WITH INSULIN (H): ICD-10-CM

## 2020-12-24 DIAGNOSIS — Z00.00 ROUTINE GENERAL MEDICAL EXAMINATION AT A HEALTH CARE FACILITY: ICD-10-CM

## 2020-12-24 DIAGNOSIS — Z78.9 NOT IMMUNE TO HEPATITIS B VIRUS: ICD-10-CM

## 2020-12-24 LAB
CHOLEST SERPL-MCNC: 151 MG/DL
ERYTHROCYTE [DISTWIDTH] IN BLOOD BY AUTOMATED COUNT: 11 % (ref 11–14.5)
FASTING STATUS PATIENT QL REPORTED: NO
HBA1C MFR BLD: 6.9 %
HCT VFR BLD AUTO: 41.8 % (ref 35–47)
HDLC SERPL-MCNC: 50 MG/DL
HGB BLD-MCNC: 13.8 G/DL (ref 12–16)
LDLC SERPL CALC-MCNC: 59 MG/DL
MCH RBC QN AUTO: 30.2 PG (ref 27–34)
MCHC RBC AUTO-ENTMCNC: 33 G/DL (ref 32–36)
MCV RBC AUTO: 91 FL (ref 80–100)
PLATELET # BLD AUTO: 193 THOU/UL (ref 140–440)
PMV BLD AUTO: 7.8 FL (ref 7–10)
RBC # BLD AUTO: 4.58 MILL/UL (ref 3.8–5.4)
TRIGL SERPL-MCNC: 208 MG/DL
WBC: 7 THOU/UL (ref 4–11)

## 2020-12-24 ASSESSMENT — MIFFLIN-ST. JEOR: SCORE: 1107.78

## 2020-12-27 ENCOUNTER — COMMUNICATION - HEALTHEAST (OUTPATIENT)
Dept: FAMILY MEDICINE | Facility: CLINIC | Age: 65
End: 2020-12-27

## 2020-12-27 DIAGNOSIS — E11.9 TYPE 2 DIABETES MELLITUS TREATED WITH INSULIN (H): ICD-10-CM

## 2020-12-27 DIAGNOSIS — Z79.4 TYPE 2 DIABETES MELLITUS TREATED WITH INSULIN (H): ICD-10-CM

## 2020-12-28 LAB
HPV SOURCE: NORMAL
HUMAN PAPILLOMA VIRUS 16 DNA: NEGATIVE
HUMAN PAPILLOMA VIRUS 18 DNA: NEGATIVE
HUMAN PAPILLOMA VIRUS FINAL DIAGNOSIS: NORMAL
HUMAN PAPILLOMA VIRUS OTHER HR: NEGATIVE
SPECIMEN DESCRIPTION: NORMAL

## 2020-12-31 ENCOUNTER — COMMUNICATION - HEALTHEAST (OUTPATIENT)
Dept: FAMILY MEDICINE | Facility: CLINIC | Age: 65
End: 2020-12-31

## 2021-01-06 LAB
BKR LAB AP ABNORMAL BLEEDING: NO
BKR LAB AP BIRTH CONTROL/HORMONES: NORMAL
BKR LAB AP CERVICAL APPEARANCE: NORMAL
BKR LAB AP GYN ADEQUACY: NORMAL
BKR LAB AP GYN INTERPRETATION: NORMAL
BKR LAB AP HPV REFLEX: NORMAL
BKR LAB AP LMP: NORMAL
BKR LAB AP PATIENT STATUS: NORMAL
BKR LAB AP PREVIOUS ABNORMAL: NORMAL
BKR LAB AP PREVIOUS NORMAL: 2015
HIGH RISK?: NO
PATH REPORT.COMMENTS IMP SPEC: NORMAL
RESULT FLAG (HE HISTORICAL CONVERSION): NORMAL

## 2021-01-07 ENCOUNTER — COMMUNICATION - HEALTHEAST (OUTPATIENT)
Dept: FAMILY MEDICINE | Facility: CLINIC | Age: 66
End: 2021-01-07

## 2021-04-21 ENCOUNTER — COMMUNICATION - HEALTHEAST (OUTPATIENT)
Dept: FAMILY MEDICINE | Facility: CLINIC | Age: 66
End: 2021-04-21

## 2021-04-21 DIAGNOSIS — Z79.4 TYPE 2 DIABETES MELLITUS TREATED WITH INSULIN (H): ICD-10-CM

## 2021-04-21 DIAGNOSIS — E11.9 TYPE 2 DIABETES MELLITUS TREATED WITH INSULIN (H): ICD-10-CM

## 2021-04-22 RX ORDER — METFORMIN HCL 500 MG
500 TABLET, EXTENDED RELEASE 24 HR ORAL
Qty: 90 TABLET | Refills: 1 | Status: SHIPPED | OUTPATIENT
Start: 2021-04-22 | End: 2021-11-16

## 2021-05-27 NOTE — PROGRESS NOTES
"  Chief Complaint   Patient presents with     Sliped and fell, Ledt Knee hurt.     Yesterday.         HPI:   Keshia Rivero is a 64 y.o. female with daughter fell yesterday landing on left knee.  Able to walk with a limp.  Hurts with really bending it.  Has been using a knee sleeve      ROS:  A 10 point comprehensive review of systems was negative except as noted.     Medications:  Current Outpatient Medications on File Prior to Visit   Medication Sig Dispense Refill     ACCU-CHEK GRETA PLUS METER Misc USE TO TEST BLOOD GLUCOSE 1 each 0     aspirin 81 MG EC tablet Take 1 tablet (81 mg total) by mouth daily. 90 tablet 3     BD ULTRA-FINE DONTAE PEN NEEDLES 32 gauge x 5/32\" Ndle USE WITH INSULIN ONCE DAILY 100 each 3     benzonatate (TESSALON) 200 MG capsule Take 1 capsule (200 mg total) by mouth 3 (three) times a day as needed for cough. 30 capsule 0     blood glucose test (ACCU-CHEK GRETA PLUS TEST STRP) strips USE ONCE DAILY AS NEEDED. 100 strip 11     cholecalciferol, vitamin D3, 5,000 unit capsule Take 1 capsule (5,000 Units total) by mouth daily. 90 capsule 3     generic lancets (MICROLET LANCET) USE TO TEST TWO TIMES A DAY. 100 each 11     glipiZIDE (GLUCOTROL XL) 10 MG 24 hr tablet Take 1 tablet (10 mg total) by mouth daily. 90 tablet 1     GLUCOSAMINE HCL ORAL Take 1 tablet by mouth daily.       insulin glargine (BASAGLAR KWIKPEN U-100 INSULIN) 100 unit/mL (3 mL) pen Inject 26 Units under the skin at bedtime. 5 adj dose pen 3     metFORMIN (GLUCOPHAGE XR) 500 MG 24 hr tablet Take 1 tablet (500 mg total) by mouth daily with breakfast. 90 tablet 1     salmon oil-omega-3 fatty acids 1,000-200 mg cap Take 1 capsule by mouth daily. 90 each 3     simvastatin (ZOCOR) 20 MG tablet Take 1 tablet (20 mg total) by mouth at bedtime. 90 tablet 3     No current facility-administered medications on file prior to visit.          Social History:  Social History     Tobacco Use     Smoking status: Never Smoker     Smokeless " tobacco: Never Used   Substance Use Topics     Alcohol use: No         Physical Exam:   Vitals:    04/15/19 1829   BP: 128/78   Pulse: 94   Resp: 18   Temp: 99.9  F (37.7  C)   TempSrc: Oral   SpO2: 96%   Weight: 143 lb (64.9 kg)   Height: 5' (1.524 m)       GEN:  NAD  KNEES:  BL--No effusion, no erythema,  no ecchymosis  Pain:  Tender over patella and along medial joint line  ROM: full  McMurrays: negative  Anterior Drawer: negative  Ligamental laxity: negative         Assessment/Plan:    1. Contusion of right knee, initial encounter  trolamine salicylate (ASPERCREME) 10 % cream      She is able to walk on knee so unlikely to have fracture.  We do not have xray available tonight.   Advised using the knee sleeve.  Ice frequently.  Tylenol or ibuprofen as needed for pain.  Instructed in quad sets.    Recheck if worsening or not improving.        Donna Winn MD      4/15/2019    The following portions of the patient's history were reviewed and updated as appropriate: allergies, current medications, past family history, past medical history, past social history, past surgical history and problem list.

## 2021-05-27 NOTE — PATIENT INSTRUCTIONS - HE
Ice knee frequently.  Use knee sleeve as needed.  Tylenol or ibuprofen as needed for pain.    Return to recheck if not getting better.

## 2021-05-29 NOTE — TELEPHONE ENCOUNTER
"Refill Approved    Rx renewed per Medication Renewal Policy. Medication was last renewed on 11/14/18.    Annalisa Dolan, [facility] Triage/Med Refill 5/21/2019     Requested Prescriptions   Pending Prescriptions Disp Refills     BD ULTRA-FINE KRISTA PEN NEEDLE 32 gauge x 5/32\" Ndle [Pharmacy Med Name: BD Pen Needle Krista U/F Miscellaneous 32G X 4 MM] 100 each 2     Sig: USE WITH INSULIN ONCE DAILY       Diabetic Supplies Refill Protocol Passed - 5/19/2019 10:21 PM        Passed - Visit with PCP or prescribing provider visit in last 6 months     Last office visit with prescriber/PCP: 2/19/2019 Charles Eddy MD OR same dept: 4/15/2019 Donna Winn MD OR same specialty: 4/15/2019 Donna Winn MD  Last physical: 11/12/2015 Last MTM visit: Visit date not found   Next visit within 3 mo: Visit date not found  Next physical within 3 mo: Visit date not found  Prescriber OR PCP: Charles Eddy MD  Last diagnosis associated with med order: 1. Diabetes mellitus type 2, uncontrolled (H)  - BD ULTRA-FINE KRISTA PEN NEEDLE 32 gauge x 5/32\" Ndle [Pharmacy Med Name: BD Pen Needle Krista U/F Miscellaneous 32G X 4 MM]; USE WITH INSULIN ONCE DAILY  Dispense: 100 each; Refill: 2    2. Type 2 diabetes mellitus treated with insulin (H)  - blood glucose test (ACCU-CHEK GRETA PLUS TEST STRP) strips; USE TO TEST TWICE DAILY AS NEEDED  Dispense: 200 strip; Refill: 3  - generic lancets (MICROLET LANCET); USE TO TEST TWO TIMES A DAY  Dispense: 200 each; Refill: 3    If protocol passes may refill for 12 months if within 3 months of last provider visit (or a total of 15 months).             Passed - A1C in last 6 months     Hemoglobin A1c   Date Value Ref Range Status   02/19/2019 7.7 (H) 3.5 - 6.0 % Final               blood glucose test (ACCU-CHEK GRETA PLUS TEST STRP) strips 200 strip 3     Sig: USE TO TEST TWICE DAILY AS NEEDED       Diabetic Supplies Refill Protocol Passed - 5/19/2019 10:21 PM        Passed - Visit with PCP or prescribing " "provider visit in last 6 months     Last office visit with prescriber/PCP: 2/19/2019 Charles Eddy MD OR same dept: 4/15/2019 Donna Winn MD OR same specialty: 4/15/2019 Donna Winn MD  Last physical: 11/12/2015 Last MTM visit: Visit date not found   Next visit within 3 mo: Visit date not found  Next physical within 3 mo: Visit date not found  Prescriber OR PCP: Charles Eddy MD  Last diagnosis associated with med order: 1. Diabetes mellitus type 2, uncontrolled (H)  - BD ULTRA-FINE KRISTA PEN NEEDLE 32 gauge x 5/32\" Ndle [Pharmacy Med Name: BD Pen Needle Krista U/F Miscellaneous 32G X 4 MM]; USE WITH INSULIN ONCE DAILY  Dispense: 100 each; Refill: 2    2. Type 2 diabetes mellitus treated with insulin (H)  - blood glucose test (ACCU-CHEK GRETA PLUS TEST STRP) strips; USE TO TEST TWICE DAILY AS NEEDED  Dispense: 200 strip; Refill: 3  - generic lancets (MICROLET LANCET); USE TO TEST TWO TIMES A DAY  Dispense: 200 each; Refill: 3    If protocol passes may refill for 12 months if within 3 months of last provider visit (or a total of 15 months).             Passed - A1C in last 6 months     Hemoglobin A1c   Date Value Ref Range Status   02/19/2019 7.7 (H) 3.5 - 6.0 % Final               generic lancets (MICROLET LANCET) 200 each 3     Sig: USE TO TEST TWO TIMES A DAY       Diabetic Supplies Refill Protocol Passed - 5/19/2019 10:21 PM        Passed - Visit with PCP or prescribing provider visit in last 6 months     Last office visit with prescriber/PCP: 2/19/2019 Charles Eddy MD OR same dept: 4/15/2019 Donna Winn MD OR same specialty: 4/15/2019 Donna Winn MD  Last physical: 11/12/2015 Last MTM visit: Visit date not found   Next visit within 3 mo: Visit date not found  Next physical within 3 mo: Visit date not found  Prescriber OR PCP: Charles Eddy MD  Last diagnosis associated with med order: 1. Diabetes mellitus type 2, uncontrolled (H)  - BD ULTRA-FINE KRISTA PEN NEEDLE 32 gauge x 5/32\" Ndle [Pharmacy Med " Name: BD Pen Needle Krista U/F Miscellaneous 32G X 4 MM]; USE WITH INSULIN ONCE DAILY  Dispense: 100 each; Refill: 2    2. Type 2 diabetes mellitus treated with insulin (H)  - blood glucose test (ACCU-CHEK GRETA PLUS TEST STRP) strips; USE TO TEST TWICE DAILY AS NEEDED  Dispense: 200 strip; Refill: 3  - generic lancets (MICROLET LANCET); USE TO TEST TWO TIMES A DAY  Dispense: 200 each; Refill: 3    If protocol passes may refill for 12 months if within 3 months of last provider visit (or a total of 15 months).             Passed - A1C in last 6 months     Hemoglobin A1c   Date Value Ref Range Status   02/19/2019 7.7 (H) 3.5 - 6.0 % Final

## 2021-05-29 NOTE — PROGRESS NOTES
ASSESMENT AND PLAN:  Diagnoses and all orders for this visit:    Type 2 diabetes mellitus treated with insulin (H)  -     Glycosylated Hemoglobin A1c  -     blood-glucose meter (CONTOUR NEXT METER) Misc; Check bloo dsugar daily  Dispense: 1 each; Refill: 0  -     blood glucose test (CONTOUR NEXT TEST STRIPS) strips; Use 1 each As Directed as needed. Dispense brand per patient's insurance at pharmacy discretion.  Dispense: 200 strip; Refill: 11  -     generic lancets (FINGERSTIX LANCETS); Dispense brand per patient's insurance at pharmacy discretion.  Dispense: 200 each; Refill: 11  -     Lipid Cascade  -     Comprehensive Metabolic Panel  No medication changes today.  A1c results pending.    Hyperlipidemia, unspecified hyperlipidemia type  Recheck lipids today.    Acute pain of left knee  Advised to take ibuprofen 400 mg 3 times a day and use knee brace.  Will consider x-ray if pain persists or worsen.    LLQ abdominal pain  -     CT Abdomen Pelvis Without Oral Without IV Contrast; Future; Expected date: 05/21/2019  CT ordered due to the duration and location of the pain.  She will follow-up after CT.    This transcription uses voice recognition software, which may contain typographical errors.      SUBJECTIVE: Keshia Rivero is a 64-year-old female here to follow-up on diabetes and hyperlipidemia.  Her last A1c was 7.7 in 2/19.  She is currently on metformin  mg daily, glipizide 10 mg daily.  She will also use Basaglar 26 units at bedtime.  She has not been able to check her blood sugars since her meter broke a few weeks ago.    She is complaining of left knee pain after a fall 1 month ago.  She saw Dr. Winn on 4/15/2019.  Note reviewed.  She was recommended to use a knee brace and was given topical cream.  She is wondering if she needs x-ray.  She is able to work full-time.  The knee hurts more when she bends her knee.  No swelling or redness.  No night pain.    She is also complaining of left lower  quadrant pain for the a few months.  She had ultrasound done in November 2018 showed only fatty liver.  She is still having daily left lower quadrant pain on and off.  She states her stool is always black initially then followed by yellow stool.  No blood in the stool.  She had colonoscopy in December 2015, recommended to have repeat colonoscopy in 10-year.    Past Medical History:   Diagnosis Date     Diabetes mellitus (H)      Fibrocystic breast      Hyperlipidemia      Patient Active Problem List   Diagnosis     Vitamin D Deficiency     Heartburn With Regurgitation     Low back pain     Hyperlipidemia, unspecified hyperlipidemia type     Back pain     Varicose vein of leg     Type 2 diabetes mellitus treated with insulin (H)       Allergies:    Allergies   Allergen Reactions     Glucophage [Metformin] Nausea And Vomiting       Social History     Tobacco Use   Smoking Status Never Smoker   Smokeless Tobacco Never Used       Review of systems otherwise negative except as listed in HPI.   Social History     Tobacco Use   Smoking Status Never Smoker   Smokeless Tobacco Never Used       OBJECTICE: /74 (Patient Site: Left Arm, Patient Position: Sitting, Cuff Size: Adult Regular)   Pulse 80   Temp 98.4  F (36.9  C) (Oral)   Resp 16   Ht 5' (1.524 m)   Wt 141 lb (64 kg)   BMI 27.54 kg/m      DATA REVIEWED:  Additional History from Old Records Summarized (2):  Radiology Tests Summarized or Ordered (1):   Labs Reviewed or Ordered (1):       GEN-alert,  in no apparent distress.  HEENT-mucous membranes are moist, neck is supple.  CV-regular rate and rhythm with no murmur.   RESP-lungs clear to auscultation .  ABDOMEN- Soft , mild left lower quadrant tenderness with no rebound or guarding.  EXTREM- Left Knee-mild tenderness medial joint line.  Mild limited range of motion on flexion.  FOOT-no open lesions or ulcers.  Sensation intact.  SKIN-normal        [facility]   5/21/2019

## 2021-05-29 NOTE — TELEPHONE ENCOUNTER
Patient daughter return after visit was over with  and ask to call [facility] pharmacy and verified glucose meter is cover. Called [facility] and spoke with head pharmacist regarding contour meter per pharmacist glucose meter contour is not cover by insurance. Insurance will only cover Accu-check frances meter per pharmacist they have order on fill and could take that order.

## 2021-05-30 NOTE — TELEPHONE ENCOUNTER
Received request for patient for additional chart notes. Faxed over and will await the determination.

## 2021-05-30 NOTE — TELEPHONE ENCOUNTER
Central PA team  [phone]  Pool: HE PA MED ([id number])          PA has been initiated.       PA form completed and faxed insurance via Cover My Meds     Key:  Keshia Rivero Cevallos: [id number] - PA Case ID: [id number] - Rx #: [id number]        Medication:  Basaglar KwikPen 100UNIT/ML pen-injectors      Insurance:  [employer] Electronic PA Form          Response will be received via fax and may take up to 5-10 business days depending on plan

## 2021-05-30 NOTE — TELEPHONE ENCOUNTER
Medication Request  Medication name: Lantus in a pen  Pharmacy Name and Location: [facility] #[id number]  Reason for request: Insurance covers basaglar  When did you use medication last?:  Daily medication  Patient offered appointment:  patient declined, has appointment scheduled 8/16/19  Okay to leave a detailed message: yes

## 2021-05-30 NOTE — TELEPHONE ENCOUNTER
Medication Request  Medication name: Accu-Chek Lancets  Pharmacy Name and Location: [facility]  Reason for request: Fax stated patient needs an Rx for the Accu-Chek Lancets. Rx must state Accu-Chek brand for the lancets.  When did you use medication last?:  n/a  Patient offered appointment:  n/a  Okay to leave a detailed message: no

## 2021-05-30 NOTE — TELEPHONE ENCOUNTER
Fax received from [facility], they have started the Prior Authorization Process via Cover My Meds    CoverMyMeds Key: [id number]    Medication Name:   insulin glargine (BASAGLAR KWIKPEN U-100 INSULIN) 100 unit/mL (3 mL) pen 5 adj dose pen 3 2/19/2019     Sig - Route: Inject 26 Units under the skin at bedtime. - Subcutaneous    Sent to pharmacy as: insulin glargine (BASAGLAR KWIKPEN U-100 INSULIN) 100 unit/mL (3 mL) pen    E-Prescribing Status: Receipt confirmed by pharmacy (2/19/2019  5:05 PM CST)          Insurance Plan: [employer] [place]  PBM:   Patient ID: not provided on fax    Please complete the PA process

## 2021-05-31 VITALS — HEIGHT: 60 IN | BODY MASS INDEX: 27.76 KG/M2 | WEIGHT: 141.38 LBS

## 2021-05-31 NOTE — TELEPHONE ENCOUNTER
Who is callinnd request from patient's pharmacy received.   Reason for Call:  Pharmacy notes:  Patient picked up Rx for Lantus Insulin on 19.  Patient has no way of drawing up the medication.  Medication was always the kwik pen.  Please send new Rx for supplies to draw this up.  Date of last appointment with primary care: 2019  Okay to leave a detailed message: [id number]  [place] ([address]

## 2021-05-31 NOTE — TELEPHONE ENCOUNTER
Called and spoke with pharmacist and gave verbal order for supplies as requested,  however Pharmacist questions the change and wonders if provider selected wrong one.  I told her there may have been changes in insurance coverage and thus the new Rx.   Pharmacist reports Pt's insurance covers Basaglar and will just refill her  Basaglar.  Gave verbal ok for Basaglar refills instead.

## 2021-05-31 NOTE — PROGRESS NOTES
ASSESMENT AND PLAN:  Diagnoses and all orders for this visit:    Type 2 diabetes mellitus treated with insulin (H)  -     Glycosylated Hemoglobin A1c  -     metFORMIN (GLUCOPHAGE XR) 500 MG 24 hr tablet; Take 1 tablet (500 mg total) by mouth daily with breakfast.  Dispense: 90 tablet; Refill: 1  -     insulin glargine (LANTUS; BASAGLAR) 100 unit/mL (3 mL) pen; 11.65 Type 2 with hyperglycemia . Use 24 U daily  Dispense: 5 adj dose pen; Refill: 11  -     glipiZIDE (GLUCOTROL XL) 10 MG 24 hr tablet; Take 1 tablet (10 mg total) by mouth daily.  Dispense: 90 tablet; Refill: 1  A1c today, results pending.  Discussed to increase metformin but patient is afraid that she might get diarrhea and cramping with the higher dose.  She is open to increase insulin.  We will increase Lantus if A1c worse than 3 months ago.  Currently taking Lantus 22 units.    Hyperlipidemia, unspecified hyperlipidemia type  Continue current medication.  Lipids at next visit.    Left knee pain, unspecified chronicity  We will consider imaging in the future if pain persist.  She will use knee braces and Tylenol as needed.    LLQ abdominal pain  Improved.  Normal CT abdomen pelvis reviewed with the patient.    This transcription uses voice recognition software, which may contain typographical errors.      SUBJECTIVE: Keshia Rivero is a 64-year-old female with history of diabetes and hyperlipidemia here for follow-up.  She takes metformin  mg daily, glipizide XL 10 mg daily and Lantus 22 units daily (Lantus was prescribed to use 26 units daily but she is using only 22).  She did not bring her blood sugar log but states her fasting blood sugar are usually 120s to 130s.  Never above 150 since last visit.  No low blood sugar with hypoglycemic symptoms reported.    She was complaining of recurrent left lower quadrant abdominal pain.  CT abdomen pelvis was negative.  The pain is improved.    She is complaining of left knee pain on and off since her fall  in spring this year.  No swelling reported.  The pain is usually tolerable.  She uses knee braces.  No acute pain today.    She takes over-the-counter ranitidine for occasional abdominal pain and wanted to know if it is okay for her to take.    No acute chest pain, shortness of breath or palpitations.  No depression symptoms.    Past Medical History:   Diagnosis Date     Diabetes mellitus (H)      Fibrocystic breast      Hyperlipidemia      Patient Active Problem List   Diagnosis     Vitamin D Deficiency     Heartburn With Regurgitation     Low back pain     Hyperlipidemia, unspecified hyperlipidemia type     Back pain     Varicose vein of leg     Type 2 diabetes mellitus treated with insulin (H)     Left knee pain, unspecified chronicity       Allergies:    Allergies   Allergen Reactions     Glucophage [Metformin] Nausea And Vomiting       Social History     Tobacco Use   Smoking Status Never Smoker   Smokeless Tobacco Never Used       Review of systems otherwise negative except as listed in HPI.   Social History     Tobacco Use   Smoking Status Never Smoker   Smokeless Tobacco Never Used       OBJECTICE: /68 (Patient Site: Left Arm, Patient Position: Sitting, Cuff Size: Adult Regular)   Pulse 68   Temp 97.9  F (36.6  C) (Oral)   Resp 16   Ht 5' (1.524 m)   Wt 140 lb (63.5 kg)   BMI 27.34 kg/m      DATA REVIEWED:    Radiology Tests Summarized or Ordered (1):   Labs Reviewed or Ordered (1):       GEN-alert,  in no apparent distress.  HEENT-mucous membranes are moist, neck is supple.  CV-regular rate and rhythm with no murmur.   RESP-lungs clear to auscultation .  ABDOMEN- Soft , no tenderness today.  No palpable masses.  EXTREM-no open lesions or ulcers.  Sensation intact.  Right Knee- Normal.  Left knee-no effusion, no erythema, mild global tenderness.  SKIN-normal        [facility]   8/16/2019

## 2021-06-01 VITALS — WEIGHT: 139.56 LBS | BODY MASS INDEX: 27.26 KG/M2

## 2021-06-01 VITALS — BODY MASS INDEX: 27.36 KG/M2 | HEIGHT: 60 IN | WEIGHT: 139.38 LBS

## 2021-06-01 NOTE — TELEPHONE ENCOUNTER
Calling to inquire if patient has had recent diabetic eye exam.  Last exam report in chart 03/15/18 at [facility].    Spoke with daughter, Teirra, who said her mother has eye appt 10/04/19 and will have report sent to Dr. Eddy via [facility].

## 2021-06-01 NOTE — TELEPHONE ENCOUNTER
----- Message from Gabriella Marie, Patrick sent at 8/15/2019  3:21 PM CDT -----  Hello,    Based on A1c, this patient would benefit from visiting with MTM pharmacist.  Can we reach out to schedule an initial visit with me for MTM? Based on their current insurance, a visit with me is fully covered and free of charge.     Of note, patient previously saw Delbert, but has not seen me yet!    Thank you  Casey Marie, GabbiD

## 2021-06-02 VITALS — HEIGHT: 60 IN | WEIGHT: 143.19 LBS | BODY MASS INDEX: 28.11 KG/M2

## 2021-06-02 VITALS — WEIGHT: 142.19 LBS | HEIGHT: 60 IN | BODY MASS INDEX: 27.92 KG/M2

## 2021-06-02 VITALS — HEIGHT: 60 IN | WEIGHT: 142.56 LBS | BODY MASS INDEX: 27.99 KG/M2

## 2021-06-03 VITALS — WEIGHT: 140 LBS | HEIGHT: 60 IN | BODY MASS INDEX: 27.48 KG/M2

## 2021-06-03 VITALS — BODY MASS INDEX: 27.68 KG/M2 | WEIGHT: 141 LBS | HEIGHT: 60 IN

## 2021-06-03 VITALS — BODY MASS INDEX: 28.07 KG/M2 | WEIGHT: 143 LBS | HEIGHT: 60 IN

## 2021-06-03 NOTE — PROGRESS NOTES
Assessment/Plan     1. Routine general medical examination at a health care facility  Colonoscopy in 12/15.  Mammogram in 12/18.  Pap in 11/2015    2. Type 2 diabetes mellitus treated with insulin (H)  A1c 7.4 today. No med changes but she will continue to work on diet and increase physical activities.   - Microalbumin, Random Urine  - Glycosylated Hemoglobin A1C  - Lipid Cascade    3. Hyperlipidemia, unspecified hyperlipidemia type  Lipid today/    4. Left knee pain, unspecified chronicity  Tylenol as needed  - Knee brace    5. Immunization due  - Pneumococcal polysaccharide vaccine 23-valent 1 yo or older, subq/IM  - Influenza, Recombinant, Inj, Quadrivalent, PF, 18+YRS    6. Screening for viral disease  - Hepatitis C Antibody (Anti-HCV)  - HIV Antigen/Antibody Screening Cascade    Subjective:      Keshia Rivero is a 64 y.o. female who presents for an annual exam. The patient is not sexually active. The patient participates in regular exercise: yes. The patient reports that there is not domestic violence in her life.  Knee pain- Left knee pain. Not worse but not better. Able to walk. Still working full time. Hurts more when rested. No swelling. No night time pain.    Glucose 120s- 130s most day per pt. Forgot to bring BS log. Dose not feel good whe < 90, but does not happen often. Happy with current regimen.     No other new concerns.     Healthy Habits:   Regular Exercise: Yes  Sunscreen Use: No  Healthy Diet: Yes  Dental Visits Regularly: Yes  Seat Belt: Yes  Sexually active: No  Self Breast Exam Monthly:Yes  Hemoccults: N/A  Flex Sig: N/A  Colonoscopy: Yes and completed 12/14/2015   Lipid Profile: completed 5/22/2019  Glucose Screen: completed 8/16/2019  Prevention of Osteoporosis: No  Last Dexa: N/A  Guns at Home:  No      Immunization History   Administered Date(s) Administered     Influenza, inj, historic,unspecified 10/01/2015     Influenza,seasonal quad, PF, =/> 6months 11/14/2018      "Influenza,seasonal,quad inj =/> 6months 10/17/2016, 2017     Pneumo Conj 13-V (2010&after) 2015     Tdap 2015     Immunization status: due today.    No exam data present    Gynecologic History  No LMP recorded. Patient is postmenopausal.  Contraception: post menopausal status  Last Pap: 2015. Results were: normal  Last mammogram: 2018. Results were: abnormal      OB History    Para Term  AB Living   1 1 1     1   SAB TAB Ectopic Multiple Live Births                  # Outcome Date GA Lbr Leroy/2nd Weight Sex Delivery Anes PTL Lv   1 Term                Current Outpatient Medications   Medication Sig Dispense Refill     ACCU-CHEK GRETA PLUS METER Misc USE TO TEST BLOOD GLUCOSE 1 each 0     aspirin 81 MG EC tablet Take 1 tablet (81 mg total) by mouth daily. 90 tablet 3     BD ULTRA-FINE DONTAE PEN NEEDLE 32 gauge x 5/32\" Ndle USE WITH INSULIN ONCE DAILY 100 each 2     BD ULTRA-FINE DONTAE PEN NEEDLES 32 gauge x 5/32\" Ndle USE WITH INSULIN ONCE DAILY 100 each 3     benzonatate (TESSALON) 200 MG capsule Take 1 capsule (200 mg total) by mouth 3 (three) times a day as needed for cough. 30 capsule 0     blood glucose test (ACCU-CHEK GRETA PLUS TEST STRP) strips USE TO TEST TWICE DAILY AS NEEDED 200 strip 3     blood glucose test (CONTOUR NEXT TEST STRIPS) strips Use 1 each As Directed as needed. Dispense brand per patient's insurance at pharmacy discretion. 200 strip 11     blood-glucose meter (CONTOUR NEXT METER) Misc Check bloo dsugar daily 1 each 0     cholecalciferol, vitamin D3, 5,000 unit capsule Take 1 capsule (5,000 Units total) by mouth daily. 90 capsule 3     generic lancets (ACCU-CHEK SOFTCLIX LANCETS) Use 1 each As Directed 3 (three) times a day. 300 each 3     generic lancets (FINGERSTIX LANCETS) Dispense brand per patient's insurance at pharmacy discretion. 200 each 11     generic lancets (MICROLET LANCET) USE TO TEST TWO TIMES A  each 3     glipiZIDE (GLUCOTROL " XL) 10 MG 24 hr tablet Take 1 tablet (10 mg total) by mouth daily. 90 tablet 1     GLUCOSAMINE HCL ORAL Take 1 tablet by mouth daily.       insulin glargine (LANTUS; BASAGLAR) 100 unit/mL (3 mL) pen 11.65 Type 2 with hyperglycemia . Use 24 U daily 5 adj dose pen 11     metFORMIN (GLUCOPHAGE XR) 500 MG 24 hr tablet Take 1 tablet (500 mg total) by mouth daily with breakfast. 90 tablet 1     salmon oil-omega-3 fatty acids 1,000-200 mg cap Take 1 capsule by mouth daily. 90 each 3     simvastatin (ZOCOR) 20 MG tablet Take 1 tablet (20 mg total) by mouth at bedtime. 90 tablet 3     trolamine salicylate (ASPERCREME) 10 % cream Apply to affected area BID 85 g 0     No current facility-administered medications for this visit.      Past Medical History:   Diagnosis Date     Diabetes mellitus (H)      Fibrocystic breast      Hyperlipidemia      Past Surgical History:   Procedure Laterality Date     COLONOSCOPY  1957     Glucophage [metformin]  Family History   Problem Relation Age of Onset     Breast cancer Neg Hx      Social History     Socioeconomic History     Marital status:      Spouse name: Not on file     Number of children: Not on file     Years of education: Not on file     Highest education level: Not on file   Occupational History     Not on file   Social Needs     Financial resource strain: Not on file     Food insecurity:     Worry: Not on file     Inability: Not on file     Transportation needs:     Medical: Not on file     Non-medical: Not on file   Tobacco Use     Smoking status: Never Smoker     Smokeless tobacco: Never Used   Substance and Sexual Activity     Alcohol use: No     Drug use: No     Sexual activity: Never     Partners: Male   Lifestyle     Physical activity:     Days per week: Not on file     Minutes per session: Not on file     Stress: Not on file   Relationships     Social connections:     Talks on phone: Not on file     Gets together: Not on file     Attends Faith service: Not  "on file     Active member of club or organization: Not on file     Attends meetings of clubs or organizations: Not on file     Relationship status: Not on file     Intimate partner violence:     Fear of current or ex partner: Not on file     Emotionally abused: Not on file     Physically abused: Not on file     Forced sexual activity: Not on file   Other Topics Concern     Not on file   Social History Narrative     Not on file       Review of Systems  Review of Systems      No breast lump, No vaginal bleeding or discharge.   No acute fever or URI symptoms.  No CP, shortness of breath or wheezing.     Objective:         Vitals:    11/21/19 0812   BP: 132/84   Pulse: 74   Temp: 98.1  F (36.7  C)   TempSrc: Oral   SpO2: 97%   Weight: 141 lb 1 oz (64 kg)   Height: 4' 11.84\" (1.52 m)     Body mass index is 27.69 kg/m .    Physical  Physical Exam     Gen - alert, orientated, NAD  Eyes - fundascopic exam limited by the undialated pupil but looks symmetric  ENT - oropharynx clear, TMs clear  Neck - supple, no palpable mass or lymphadenopathy  CV - RRR, no murmur  Breast - no dominant mass on either side, no axillary mass.  Resp - lungs CTA  Ab - soft, nontender, no palpable mass or organomegaly   - Declined. Deferred. No concerns.   Extrem - warm, no edema  Neuro - CN II-XII intact, strength, sensation, reflexes intact and symmetric  Skin - no rash.  No atypical appearing lesions seen.     "

## 2021-06-04 VITALS
OXYGEN SATURATION: 97 % | WEIGHT: 141.06 LBS | BODY MASS INDEX: 27.69 KG/M2 | HEIGHT: 60 IN | SYSTOLIC BLOOD PRESSURE: 132 MMHG | TEMPERATURE: 98.1 F | HEART RATE: 74 BPM | DIASTOLIC BLOOD PRESSURE: 84 MMHG

## 2021-06-04 VITALS
OXYGEN SATURATION: 96 % | HEIGHT: 60 IN | TEMPERATURE: 97.4 F | BODY MASS INDEX: 28.07 KG/M2 | RESPIRATION RATE: 16 BRPM | WEIGHT: 143 LBS | DIASTOLIC BLOOD PRESSURE: 76 MMHG | SYSTOLIC BLOOD PRESSURE: 130 MMHG

## 2021-06-05 VITALS
HEART RATE: 80 BPM | TEMPERATURE: 98.1 F | SYSTOLIC BLOOD PRESSURE: 126 MMHG | WEIGHT: 137.5 LBS | OXYGEN SATURATION: 96 % | DIASTOLIC BLOOD PRESSURE: 80 MMHG | BODY MASS INDEX: 27 KG/M2 | RESPIRATION RATE: 16 BRPM | HEIGHT: 60 IN

## 2021-06-05 VITALS
TEMPERATURE: 98 F | SYSTOLIC BLOOD PRESSURE: 124 MMHG | HEIGHT: 60 IN | WEIGHT: 141.1 LBS | RESPIRATION RATE: 17 BRPM | DIASTOLIC BLOOD PRESSURE: 72 MMHG | HEART RATE: 76 BPM | OXYGEN SATURATION: 97 % | BODY MASS INDEX: 27.7 KG/M2

## 2021-06-06 NOTE — TELEPHONE ENCOUNTER
Prior Authorization Request  Who s requesting:  Pharmacy  Pharmacy Name and Location: [facility][id number]  Medication Name: insulin glargine (BASAGLAR) 100 unit/mL (3 mL) pen  Insurance Plan: [employer]   Insurance Member ID Number:  [id number]  CoverMyMeds Key: [id number]  Informed patient that prior authorizations can take up to 10 business days for response:   NA  Okay to leave a detailed message: No

## 2021-06-06 NOTE — TELEPHONE ENCOUNTER
Refill Approved    Rx renewed per Medication Renewal Policy. Medication was last renewed on 8/16/19.    Annalisa Dolan, [facility] Triage/Med Refill 2/19/2020     Requested Prescriptions   Pending Prescriptions Disp Refills     metFORMIN (GLUCOPHAGE-XR) 500 MG 24 hr tablet [Pharmacy Med Name: metFORMIN HCl ER Oral Tablet Extended Release 24 Hour 500 MG] 30 tablet 0     Sig: Take 1 tablet (500 mg total) by mouth daily with breakfast.       Metformin Refill Protocol Passed - 2/16/2020  7:58 PM        Passed - Blood pressure in last 12 months     BP Readings from Last 1 Encounters:   11/21/19 132/84             Passed - LFT or AST or ALT in last 12 months     Albumin   Date Value Ref Range Status   05/22/2019 4.2 3.5 - 5.0 g/dL Final     Bilirubin, Total   Date Value Ref Range Status   05/22/2019 0.2 0.0 - 1.0 mg/dL Final     Alkaline Phosphatase   Date Value Ref Range Status   05/22/2019 111 45 - 120 U/L Final     AST   Date Value Ref Range Status   05/22/2019 40 0 - 40 U/L Final     ALT   Date Value Ref Range Status   05/22/2019 29 0 - 45 U/L Final     Protein, Total   Date Value Ref Range Status   05/22/2019 7.8 6.0 - 8.0 g/dL Final                Passed - GFR or Serum Creatinine in last 6 months     GFR MDRD Non Af Amer   Date Value Ref Range Status   05/22/2019 >60 >60 mL/min/1.73m2 Final     GFR MDRD Af Amer   Date Value Ref Range Status   05/22/2019 >60 >60 mL/min/1.73m2 Final             Passed - Visit with PCP or prescribing provider visit in last 6 months or next 3 months     Last office visit with prescriber/PCP: Visit date not found OR same dept: 8/16/2019 Charles Eddy MD OR same specialty: 8/16/2019 Charles Eddy MD Last physical: 11/21/2019 Last MTM visit: Visit date not found         Next appt within 3 mo: Visit date not found  Next physical within 3 mo: Visit date not found  Prescriber OR PCP: Charles Eddy MD  Last diagnosis associated with med order: 1. Type 2 diabetes mellitus treated with insulin (H)  -  metFORMIN (GLUCOPHAGE-XR) 500 MG 24 hr tablet [Pharmacy Med Name: metFORMIN HCl ER Oral Tablet Extended Release 24 Hour 500 MG]; Take 1 tablet (500 mg total) by mouth daily with breakfast.  Dispense: 30 tablet; Refill: 0     If protocol passes may refill for 12 months if within 3 months of last provider visit (or a total of 15 months).           Passed - A1C in last 6 months     Hemoglobin A1c   Date Value Ref Range Status   11/21/2019 7.4 (H) 3.5 - 6.0 % Final               Passed - Microalbumin in last year      Microalbumin, Random Urine   Date Value Ref Range Status   11/21/2019 28.54 (H) 0.00 - 1.99 mg/dL Final

## 2021-06-06 NOTE — TELEPHONE ENCOUNTER
Central PA team  [phone]  Pool: HE PA MED ([id number])          PA has been initiated.       PA form completed and faxed insurance via Cover My Meds     Key:  [id number] - Rx #: [id number]     Medication:  Basaglar KwikPen 100UNIT/ML pen-injectors    Insurance:  [employer] PMAP        Response will be received via fax and may take up to 5-10 business days depending on plan

## 2021-06-06 NOTE — TELEPHONE ENCOUNTER
Central PA team  [phone]  Pool: HE PA MED ([id number])          PA has been initiated.       PA form completed and faxed insurance via Cover My Meds     Key:  [id number] - PA Case ID: [id number] - Rx #: [id number]     Medication:  Basaglar KwikPen 100UNIT/ML pen-injectors    Insurance:  [employer]        Response will be received via fax and may take up to 5-10 business days depending on plan

## 2021-06-06 NOTE — PROGRESS NOTES
ASSESMENT AND PLAN:  Diagnoses and all orders for this visit:    Type 2 diabetes mellitus treated with insulin (H)  -     Glycosylated Hemoglobin A1c  -     Comprehensive Metabolic Panel  -     metFORMIN (GLUCOPHAGE-XR) 500 MG 24 hr tablet; Take 1 tablet (500 mg total) by mouth daily with breakfast.  Dispense: 90 tablet; Refill: 1  A1c 7.4 today.  Patient does not want to make changes.  She will work on diet and regular exercise.  Follow-up in 6 months.    Hyperlipidemia, unspecified hyperlipidemia type  -     simvastatin (ZOCOR) 20 MG tablet; Take 1 tablet (20 mg total) by mouth at bedtime.  Dispense: 90 tablet; Refill: 1    Vitamin D deficiency  Continue daily vitamin D supplement.    This transcription uses voice recognition software, which may contain typographical errors.      SUBJECTIVE: Keshia Rivero is a 64-year-old female with history of diabetes, hyperlipidemia and vitamin D deficiency here for follow-up.  She is currently on metformin XR  500 mg daily, glipizide XL 10 mg daily and Lantus 24 units daily for diabetes.  Last A1c was 7.4 in 11/19.  Fasting blood sugar ranges from 96 to 150s but mostly in the 120s- 130s range.  She is tolerating medications well.  She takes Zocor for hyperlipidemia, no side effects reported.  She wants all her medications to be refilled for 6 months.  No new concerns or complaint.  No acute fever or URI symptoms.  No recent travel.  No acute chest pain, shortness of breath or palpitations.    Past Medical History:   Diagnosis Date     Diabetes mellitus (H)      Fibrocystic breast      Hyperlipidemia      Patient Active Problem List   Diagnosis     Vitamin D Deficiency     Heartburn With Regurgitation     Low back pain     Hyperlipidemia, unspecified hyperlipidemia type     Back pain     Varicose vein of leg     Type 2 diabetes mellitus treated with insulin (H)     Left knee pain, unspecified chronicity       Allergies:    Allergies   Allergen Reactions     Glucophage  [Metformin] Nausea And Vomiting       Social History     Tobacco Use   Smoking Status Never Smoker   Smokeless Tobacco Never Used       Review of systems otherwise negative except as listed in HPI.   Social History     Tobacco Use   Smoking Status Never Smoker   Smokeless Tobacco Never Used       OBJECTICE: There were no vitals taken for this visit.    DATA REVIEWED:    Labs Reviewed or Ordered (1):       GEN-alert,  in no apparent distress.  HEENT-neck is supple.  CV-regular rate and rhythm with no murmur.   RESP-lungs clear to auscultation .  ABDOMEN- Soft , not tender.  EXTREM- Sensation intact. No open lesions or ulcers.   SKIN-normal        [facility]   2/27/2020

## 2021-06-06 NOTE — TELEPHONE ENCOUNTER
Preferred alternative Lantus pen sent to the pharmacy.  Please call and confirm with the pharmacy.  Dr. Charles Eddy  2/20/2020 3:58 PM

## 2021-06-07 NOTE — TELEPHONE ENCOUNTER
Refill Approved    Rx renewed per Medication Renewal Policy. Medication was last renewed on 2/19/19.    Annalisa Dolan, [facility] Connection Triage/Med Refill 3/31/2020     Requested Prescriptions   Pending Prescriptions Disp Refills     aspirin 81 MG EC tablet [Pharmacy Med Name: Aspirin Low Dose Oral Tablet Delayed Release 81 MG] 90 tablet 2     Sig: Take 1 tablet (81 mg total) by mouth daily.       Aspirin/Dipyridamole Refill Protocol Passed - 3/29/2020  6:54 PM        Passed - PCP or prescribing provider visit in past 12 months       Last office visit with prescriber/PCP: 2/27/2020 Charles Eddy MD OR same dept: 2/27/2020 Charles Eddy MD OR same specialty: 2/27/2020 Charles Eddy MD  Last physical: 11/21/2019 Last MTM visit: Visit date not found    Next appt within 3 mo: Visit date not found Next physical within 3 mo: Visit date not found  Prescriber OR PCP: Charles Eddy MD  Last diagnosis associated with med order: 1. Type 2 diabetes mellitus treated with insulin (H)  - aspirin 81 MG EC tablet [Pharmacy Med Name: Aspirin Low Dose Oral Tablet Delayed Release 81 MG]; Take 1 tablet (81 mg total) by mouth daily.  Dispense: 90 tablet; Refill: 2    If protocol passes may refill for 6 months if within 3 months of last provider visit (or a total of 9 months).

## 2021-06-08 NOTE — TELEPHONE ENCOUNTER
Rx pend please sign.     Per [facility] need new Rx for refill request of Lantus SoloStar for Qty: 15 ml. xl

## 2021-06-11 NOTE — TELEPHONE ENCOUNTER
Refill Approved    Rx renewed per Medication Renewal Policy. Medication was last renewed on 3/31/20.    Annalisa Dolan, [facility] Triage/Med Refill 9/1/2020     Requested Prescriptions   Pending Prescriptions Disp Refills     aspirin 81 MG EC tablet [Pharmacy Med Name: Aspirin Low Dose Oral Tablet Delayed Release 81 MG] 90 tablet 0     Sig: TAKE ONE TABLET BY MOUTH ONE TIME DAILY       Aspirin/Dipyridamole Refill Protocol Passed - 8/29/2020  8:52 PM        Passed - PCP or prescribing provider visit in past 12 months       Last office visit with prescriber/PCP: 2/27/2020 Charles Eddy MD OR same dept: 2/27/2020 Charles Eddy MD OR same specialty: 2/27/2020 Charles Eddy MD  Last physical: 11/21/2019 Last MTM visit: Visit date not found    Next appt within 3 mo: Visit date not found Next physical within 3 mo: Visit date not found  Prescriber OR PCP: Charles Eddy MD  Last diagnosis associated with med order: 1. Type 2 diabetes mellitus treated with insulin (H)  - aspirin 81 MG EC tablet [Pharmacy Med Name: Aspirin Low Dose Oral Tablet Delayed Release 81 MG]; TAKE ONE TABLET BY MOUTH ONE TIME DAILY   Dispense: 90 tablet; Refill: 0    If protocol passes may refill for 6 months if within 3 months of last provider visit (or a total of 9 months).

## 2021-06-11 NOTE — TELEPHONE ENCOUNTER
RN cannot approve Refill Request    RN can NOT refill this medication Protocol failed and NO refill given. Last office visit: 2/27/2020 Charles Eddy MD Last Physical: 11/21/2019 Last MTM visit: Visit date not found Last visit same specialty: 2/27/2020 Charles dEdy MD.  Next visit within 3 mo: Visit date not found  Next physical within 3 mo: Visit date not found      Annalias Dolan, [facility] Triage/Med Refill 10/1/2020    Requested Prescriptions   Pending Prescriptions Disp Refills     ACCU-CHEK GRETA PLUS TEST STRP strips [Pharmacy Med Name: Accu-Chek Greta Plus In Vitro Strip]  0     Sig: USE 1 TO TEST BLOOD SUGAR AS NEEDED.       Diabetic Supplies Refill Protocol Failed - 9/28/2020  8:14 PM        Failed - Visit with PCP or prescribing provider visit in last 6 months     Last office visit with prescriber/PCP: 2/27/2020 Charles Eddy MD OR same dept: 2/27/2020 Charles Eddy MD OR same specialty: 2/27/2020 Charles Eddy MD  Last physical: 11/21/2019 Last MTM visit: Visit date not found   Next visit within 3 mo: Visit date not found  Next physical within 3 mo: Visit date not found  Prescriber OR PCP: Charles Eddy MD  Last diagnosis associated with med order: 1. Type 2 diabetes mellitus treated with insulin (H)  - ACCU-CHEK GRETA PLUS TEST STRP strips [Pharmacy Med Name: Accu-Chek Greta Plus In Vitro Strip]; USE 1 TO TEST BLOOD SUGAR AS NEEDED.; Refill: 0    If protocol passes may refill for 12 months if within 3 months of last provider visit (or a total of 15 months).             Failed - A1C in last 6 months     Hemoglobin A1c   Date Value Ref Range Status   02/27/2020 7.4 (H) 3.5 - 6.0 % Final

## 2021-06-12 NOTE — PROGRESS NOTES
ASSESMENT AND PLAN:  Diagnoses and all orders for this visit:    Type 2 diabetes mellitus treated with insulin  -     Glycosylated Hemoglobin A1c  -     Comprehensive Metabolic Panel  -     Microalbumin, Random Urine    Normal diabetic eye exam in 2/17.    Hyperlipidemia, unspecified hyperlipidemia type  -     Lipid Cascade  -     simvastatin (ZOCOR) 20 MG tablet; Take 1 tablet (20 mg total) by mouth at bedtime.  Dispense: 30 tablet; Refill: 11  -     salmon oil-omega-3 fatty acids 1,000-200 mg cap; Take 1 capsule by mouth daily.  Dispense: 30 each; Refill: 11    Back pain  Tylenol as needed.    Varicose vein of leg  Referral if indicated in the future.  Monitor for now.    Need for influenza vaccination  -     Influenza, Seasonal,Quad Inj, 36+ MOS    Preventative health care  -     aspirin 81 MG EC tablet; Take 1 tablet (81 mg total) by mouth daily.  Dispense: 30 tablet; Refill: 11    Cough  -     benzonatate (TESSALON) 100 MG capsule; take 1 capsule by mouth 3 times a day as needed for cough  Dispense: 30 capsule; Refill: 0  No cough today, but wants med to have if needed .    Vitamin D deficiency  -     cholecalciferol, vitamin D3, 5,000 unit capsule; Take 5,000 Units by mouth daily.  Dispense: 30 capsule; Refill: 11    She will follow up in 6 months.       SUBJECTIVE: Keshia Rivero  is very pleasant 61-year-old female with history of diabetes, hyperlipidemia and vitamin D deficiency here for follow-up.  She uses Lantus 20 units daily at bedtime.  Has been checking her sugar once every 2 days.  States her fasting sugar runs in the low 100s, never above 150 per daughter. No hypoglycemic symptoms.  She hasn't been able to exercise much she has been watching her diet.     She has h/o mild cough in the winter months, wanting cough med refill. No known h/o asthma. No SOB or wheezing with the cough.  No fever or chills.  No chest pain, difficulty breathing or wheezing.     She has some mild back pain on and off,  radiation. Pain occurs after standing for along period of time. No injury to the back.  No urinary or bowel symptoms.    She has varicose vein on both legs, has been there for many years . Has some pain on and off but not severe. No bleeding or ulceration.     No acute CP, SOB or wheezing.     Past Medical History:   Diagnosis Date     Diabetes mellitus      Fibrocystic breast      Hyperlipidemia      Patient Active Problem List   Diagnosis     Vitamin D Deficiency     Heartburn With Regurgitation     Low back pain     Hyperlipidemia, unspecified hyperlipidemia type     Back pain     Varicose vein of leg     Type 2 diabetes mellitus treated with insulin       Allergies:    Allergies   Allergen Reactions     Glucophage [Metformin] Nausea And Vomiting       History   Smoking Status     Never Smoker   Smokeless Tobacco     Never Used       Review of systems otherwise negative except as listed in HPI.   History   Smoking Status     Never Smoker   Smokeless Tobacco     Never Used       OBJECTICE: /78 (Patient Site: Right Arm, Patient Position: Sitting, Cuff Size: Adult Regular)  Pulse 76  Temp 98.1  F (36.7  C) (Oral)   Resp 16  Ht 5' (1.524 m)  Wt 141 lb 6 oz (64.1 kg)  BMI 27.61 kg/m2    DATA REVIEWED:    Labs Reviewed or Ordered (1):       GEN-alert,  in no apparent distress.  HEENT-mucous membranes are moist, neck is supple.  CV-regular rate and rhythm with no murmur.   RESP-lungs clear to auscultation .  ABDOMEN- Soft , not tender.  EXTREM- No open lesions or ulcers. Sensation intact.  SKIN-normal        Charles Eddy   9/14/2017

## 2021-06-13 NOTE — TELEPHONE ENCOUNTER
Refill Approved    Rx renewed per Medication Renewal Policy. Medication was last renewed on 7/26/2019.    Wendy Gomez, [facility] Connection Triage/Med Refill 11/26/2020     Requested Prescriptions   Pending Prescriptions Disp Refills     ACCU-CHEK SOFTCLIX LANCETS lancets [Pharmacy Med Name: Accu-Chek Softclix Lancets Miscellaneous] 300 each 0     Sig: Use 1 each As Directed 3 (three) times a day.       Diabetic Supplies Refill Protocol Passed - 11/26/2020  1:19 PM        Passed - Visit with PCP or prescribing provider visit in last 6 months     Last office visit with prescriber/PCP: Visit date not found OR same dept: 10/13/2020 Charles Eddy MD OR same specialty: 10/13/2020 Charles Eddy MD  Last physical: Visit date not found Last MTM visit: Visit date not found   Next visit within 3 mo: Visit date not found  Next physical within 3 mo: Visit date not found  Prescriber OR PCP: Jase Robert MD  Last diagnosis associated with med order: 1. Type 2 diabetes mellitus treated with insulin (H)  - ACCU-CHEK SOFTCLIX LANCETS lancets [Pharmacy Med Name: Accu-Chek Softclix Lancets Miscellaneous]; Use 1 each As Directed 3 (three) times a day.  Dispense: 300 each; Refill: 0    If protocol passes may refill for 12 months if within 3 months of last provider visit (or a total of 15 months).             Passed - A1C in last 6 months     Hemoglobin A1c   Date Value Ref Range Status   10/13/2020 7.4 (H) <=5.6 % Final     Comment:     Normal <5.7% Prediabete 5.7-6.4% Diabletes 6.5% or higher - adopted from ADA consensus guidelines

## 2021-06-14 NOTE — TELEPHONE ENCOUNTER
Refill Approved    Rx renewed per Medication Renewal Policy. Medication was last renewed on 9/1/220.    Annalisa Dolan, [facility] Triage/Med Refill 12/28/2020     Requested Prescriptions   Pending Prescriptions Disp Refills     aspirin 81 MG EC tablet [Pharmacy Med Name: Aspirin Low Dose Oral Tablet Delayed Release 81 MG] 90 tablet 0     Sig: TAKE ONE TABLET BY MOUTH ONE TIME DAILY       Aspirin/Dipyridamole Refill Protocol Passed - 12/27/2020 11:12 AM        Passed - PCP or prescribing provider visit in past 12 months       Last office visit with prescriber/PCP: 10/13/2020 Charles Eddy MD OR same dept: 10/13/2020 Charles Eddy MD OR same specialty: 10/13/2020 Charles Eddy MD  Last physical: 12/24/2020 Last MTM visit: Visit date not found    Next appt within 3 mo: Visit date not found Next physical within 3 mo: Visit date not found  Prescriber OR PCP: Charles Eddy MD  Last diagnosis associated with med order: 1. Type 2 diabetes mellitus treated with insulin (H)  - aspirin 81 MG EC tablet [Pharmacy Med Name: Aspirin Low Dose Oral Tablet Delayed Release 81 MG]; TAKE ONE TABLET BY MOUTH ONE TIME DAILY   Dispense: 90 tablet; Refill: 0    If protocol passes may refill for 6 months if within 3 months of last provider visit (or a total of 9 months).

## 2021-06-14 NOTE — PROGRESS NOTES
"FEMALE PREVENTATIVE EXAM    Assessment and Plan:     1. Routine general medical examination at a health care facility  Will schedule for mammogram    2. Type 2 diabetes mellitus treated with insulin (H)  - HM2(CBC w/o Differential)  - Lipid Cascade RANDOM  - Glycosylated Hemoglobin A1c    3. Not immune to hepatitis B virus  Hep B vaccine 2nd dose today    4. Immunization due  - Hepatitis B Vaccine Age 20 years and above    5. Hyperlipidemia, unspecified hyperlipidemia type  Recheck lipid today    6. Screening for cervical cancer  - Gynecologic Cytology (PAP Smear)    Next follow up:  No follow-ups on file.    Immunization Review  Adult Imm Review: Due today, orders placed    I discussed the following with the patient:   Adult Healthy Living: Importance of regular exercise        Subjective:   Chief Complaint: Keshia Rivero is an 65 y.o. female here for a preventative health visit.    HPI: The patient is here for physical.   For diabetes she takes Metformin 1000 mg twice a day, glipizide XL 10 mg daily and Lantus 24 units daily.  Advised to increase Lantus to 26 units few months ago.  But she thinks 26 is too high, she feels \" weak\"  after using 26 and has been using 24 units only.  Her fasting blood sugar are in the low 100s, the highest was 140 in the past 2 weeks.  No low blood sugar with hypoglycemic symptoms in the recent weeks.  No other new concerns.  She takes all her medications as prescribed.  She is still working part-time, planning on retiring in 2 years.    No known exposure to COVID-19.  No acute fever, sore throat, cough, shortness of breath or chest pain.    Healthy Habits  Are you taking a daily aspirin? Yes  Do you typically exercising at least 40 min, 3-4 times per week?  Yes  Do you usually eat at least 4 servings of fruit and vegetables a day, include whole grains and fiber and avoid regularly eating high fat foods? Yes  Have you had an eye exam in the past two years? Yes  Do you see a dentist " "twice per year? Yes  Do you have any concerns regarding sleep? No    Safety Screen  If you own firearms, are they secured in a locked gun cabinet or with trigger locks? The patient does not own any firearms  Do you feel you are safe where you are living?: Yes (12/24/2020  8:13 AM)  Do you feel you are safe in your relationship(s)?: Yes (12/24/2020  8:13 AM)      Review of Systems:  Please see above.  The rest of the review of systems are negative for all systems.       Cancer Screening       Status Date      PAP SMEAR Next Due 11/12/2020      Done 11/12/2015 GYNECOLOGIC CYTOLOGY (PAP SMEAR)    MAMMOGRAM Next Due 12/19/2020      Done 12/19/2018 MAMMO DIAGNOSTIC RIGHT     Patient has more history with this topic...              History     Reviewed By Date/Time Sections Reviewed    Cortney Espinal MA 12/24/2020  8:11 AM Tobacco            Objective:   Vital Signs:   Visit Vitals  /72   Pulse 76   Temp 98  F (36.7  C) (Oral)   Resp 17   Ht 4' 11.76\" (1.518 m)   Wt 141 lb 1.6 oz (64 kg)   SpO2 97%   BMI 27.77 kg/m           PHYSICAL EXAM  Gen - alert, orientated, NAD  Eyes - fundascopic exam limited by the undialated pupil but looks symmetric  ENT - oropharynx clear, TMs clear  Neck - supple, no palpable mass or lymphadenopathy  CV - RRR, no murmur  Breast - no dominant mass on either side, no axillary mass.  Resp - lungs CTA  Ab - soft, nontender, no palpable mass or organomegaly   - normal appearance to the external genetalia, vaginal mucosa normal, physiologic discharge, no palpable adenexal mass, cervix appears normal  Extrem - warm, no edema  Neuro - CN II-XII intact, strength, sensation, reflexes intact and symmetric  Skin - no rash.  No atypical appearing lesions seen.       Additional Screenings Completed Today:     "

## 2021-06-16 PROBLEM — I83.90 VARICOSE VEIN OF LEG: Status: ACTIVE | Noted: 2017-09-14

## 2021-06-16 PROBLEM — M54.9 BACK PAIN: Status: ACTIVE | Noted: 2017-09-14

## 2021-06-16 PROBLEM — Z78.9 NOT IMMUNE TO HEPATITIS B VIRUS: Status: ACTIVE | Noted: 2020-10-15

## 2021-06-16 PROBLEM — M25.562 LEFT KNEE PAIN, UNSPECIFIED CHRONICITY: Status: ACTIVE | Noted: 2019-08-16

## 2021-06-16 PROBLEM — Z79.4 TYPE 2 DIABETES MELLITUS TREATED WITH INSULIN (H): Status: ACTIVE | Noted: 2017-09-14

## 2021-06-16 PROBLEM — E11.9 TYPE 2 DIABETES MELLITUS TREATED WITH INSULIN (H): Status: ACTIVE | Noted: 2017-09-14

## 2021-06-16 NOTE — TELEPHONE ENCOUNTER
Refill Approved    Rx renewed per Medication Renewal Policy. Medication was last renewed on 10/13/20.    Trung Busch, [facility] Triage/Med Refill 4/22/2021     Requested Prescriptions   Pending Prescriptions Disp Refills     metFORMIN (GLUCOPHAGE-XR) 500 MG 24 hr tablet [Pharmacy Med Name: metFORMIN HCl ER Oral Tablet Extended Release 24 Hour 500 MG] 90 tablet 0     Sig: Take 1 tablet (500 mg total) by mouth daily with breakfast.       Metformin Refill Protocol Passed - 4/21/2021  6:44 PM        Passed - Blood pressure in last 12 months     BP Readings from Last 1 Encounters:   12/24/20 124/72             Passed - LFT or AST or ALT in last 12 months     Albumin   Date Value Ref Range Status   10/13/2020 4.4 3.5 - 5.0 g/dL Final     Bilirubin, Total   Date Value Ref Range Status   10/13/2020 0.6 0.0 - 1.0 mg/dL Final     Alkaline Phosphatase   Date Value Ref Range Status   10/13/2020 93 45 - 120 U/L Final     AST   Date Value Ref Range Status   10/13/2020 21 0 - 40 U/L Final     ALT   Date Value Ref Range Status   10/13/2020 16 0 - 45 U/L Final     Protein, Total   Date Value Ref Range Status   10/13/2020 7.6 6.0 - 8.0 g/dL Final                Passed - GFR or Serum Creatinine in last 6 months     GFR MDRD Non Af Amer   Date Value Ref Range Status   10/13/2020 >60 >60 mL/min/1.73m2 Final     GFR MDRD Af Amer   Date Value Ref Range Status   10/13/2020 >60 >60 mL/min/1.73m2 Final             Passed - Visit with PCP or prescribing provider visit in last 6 months or next 3 months     Last office visit with prescriber/PCP: Visit date not found OR same dept: 10/13/2020 Charles Eddy MD OR same specialty: 10/13/2020 Charles Eddy MD Last physical: 12/24/2020 Last MTM visit: Visit date not found         Next appt within 3 mo: Visit date not found  Next physical within 3 mo: Visit date not found  Prescriber OR PCP: Charles Eddy MD  Last diagnosis associated with med order: 1. Type 2 diabetes mellitus treated with insulin (H)  -  metFORMIN (GLUCOPHAGE-XR) 500 MG 24 hr tablet [Pharmacy Med Name: metFORMIN HCl ER Oral Tablet Extended Release 24 Hour 500 MG]; Take 1 tablet (500 mg total) by mouth daily with breakfast.  Dispense: 90 tablet; Refill: 0     If protocol passes may refill for 12 months if within 3 months of last provider visit (or a total of 15 months).           Passed - A1C in last 6 months     Hemoglobin A1c   Date Value Ref Range Status   12/24/2020 6.9 (H) <=5.6 % Final               Passed - Microalbumin in last year      Microalbumin, Random Urine   Date Value Ref Range Status   10/13/2020 33.48 (H) 0.00 - 1.99 mg/dL Final

## 2021-06-16 NOTE — TELEPHONE ENCOUNTER
Telephone Encounter by Alicia Ugalde at 2/20/2020  2:32 PM     Author: Alicia Ugalde Service: -- Author Type: --    Filed: 2/20/2020  2:33 PM Encounter Date: 2/18/2020 Status: Signed    : Alicia Ugalde       PRIOR AUTHORIZATION DENIED    Denial Rational: Insurance prefers Lantus AND Levemir            Appeal Information: If provider would like to appeal please provide a letter of medical necessity stating why formulary alternatives would not be clinically appropriate for the patient and route back to the PA team.

## 2021-06-16 NOTE — PROGRESS NOTES
ASSESMENT AND PLAN:  Diagnoses and all orders for this visit:    Type 2 diabetes mellitus treated with insulin  -     Glycosylated Hemoglobin A1c  -     Comprehensive Metabolic Panel  A1c 8.1 today.  Will increase basaglar to 24 U daily from 22 U.  Reported home blood sugar in the 90s-150s.  Recheck in 3 months.    Hyperlipidemia, unspecified hyperlipidemia type  -     Lipid Cascade  Continue current medication.    Vitamin D deficiency  -     Vitamin D, Total (25-Hydroxy)  Continue daily supplement.    Cough  -     benzonatate (TESSALON) 200 MG capsule; Take 1 capsule (200 mg total) by mouth 3 (three) times a day as needed for cough.  Dispense: 30 capsule; Refill: 2    No med refills needed today.    SUBJECTIVE: Keshia Rivero is a very pleasant 63-year-old female with history of diabetes and hyperlipidemia here for follow-up.  She is on insulin.  Last A1c was 8.6 in September 2017, she was recommended to increase her basaglar to 24 units daily but she states she has been using only 22 units daily.  She checks her blood sugar once a day.  She did not bring her blood sugar log or her meter today but reported her blood sugar ranges from 90s-150s, never above 150.  Denied low blood sugar with hypoglycemic symptoms.    She takes Zocor 20 mg daily.  No side effects reported including muscle aches and pain.    She has been having occasional cough but no fever, shortness of breath or wheezing.  She was given Tessalon Perles in the past.  States it helps, wondering if she can have a refill.    No other new problems today.        Past Medical History:   Diagnosis Date     Diabetes mellitus      Fibrocystic breast      Hyperlipidemia      Patient Active Problem List   Diagnosis     Vitamin D Deficiency     Heartburn With Regurgitation     Low back pain     Hyperlipidemia, unspecified hyperlipidemia type     Back pain     Varicose vein of leg     Type 2 diabetes mellitus treated with insulin       Allergies:    Allergies    Allergen Reactions     Glucophage [Metformin] Nausea And Vomiting       History   Smoking Status     Never Smoker   Smokeless Tobacco     Never Used       Review of systems otherwise negative except as listed in HPI.   History   Smoking Status     Never Smoker   Smokeless Tobacco     Never Used       OBJECTICE: /82 (Patient Site: Left Arm, Patient Position: Sitting, Cuff Size: Adult Regular)  Pulse 80  Temp 98.2  F (36.8  C) (Oral)   Resp 20  Ht 5' (1.524 m)  Wt 139 lb 6 oz (63.2 kg)  BMI 27.22 kg/m2    DATA REVIEWED:  Labs Reviewed or Ordered (1):       GEN-alert,  in no apparent distress.  HEENT-mucous membranes are moist, neck is supple.  CV-regular rate and rhythm with no murmur.   RESP-lungs clear to auscultation .  ABDOMEN- Soft , not tender.  EXTREM- No edema.  Sensation intact.  No open lesions or ulcers.  SKIN-normal    This transcription uses voice recognition software, which may contain typographical errors.        Charles Eddy   3/15/2018

## 2021-06-16 NOTE — TELEPHONE ENCOUNTER
Telephone Encounter by Graciela Bazan at 7/26/2019  5:32 PM     Author: Graciela Bazan Service: -- Author Type: --    Filed: 7/26/2019  5:35 PM Encounter Date: 7/22/2019 Status: Signed    : Graciela Bazan       PRIOR AUTHORIZATION DENIED    Denial Rational: THE PATIENT NEEDS TO HAVE TRIED/FAILED LANTUS, LEVEMIR. THEY HAVE TO HAVE BEEN TRIED FOR A SUFFICIENT PERIOD OF TIME OR THE THERAPIES HAVE TO HAD CAUSED HARM, BE INEFFECTIVE.          Appeal Information: If the provider would like to appeal this denial, please provide a letter of medical necessity and once it has been completed and placed in the patient's chart, notify the [facility] PA Team ([facility] MED [id number]) and the appeal can be initiated on behalf of the patient and provider.  Please also include any therapies that the patient has tried and their outcomes.

## 2021-06-18 NOTE — LETTER
Letter by Charles Eddy MD at      Author: Charles Eddy MD Service: -- Author Type: --    Filed:  Encounter Date: 2/21/2019 Status: (Other)       Keshia Rivero  [address] [facility] [address]             February 21, 2019         Dear Ms. Rivero,    Below are the results from your recent visit:    Resulted Orders   Glycosylated Hemoglobin A1c   Result Value Ref Range    Hemoglobin A1c 7.7 (H) 3.5 - 6.0 %   Comprehensive Metabolic Panel   Result Value Ref Range    Sodium 140 136 - 145 mmol/L    Potassium 3.7 3.5 - 5.0 mmol/L    Chloride 106 98 - 107 mmol/L    CO2 25 22 - 31 mmol/L    Anion Gap, Calculation 9 5 - 18 mmol/L    Glucose 146 (H) 70 - 125 mg/dL    BUN 12 8 - 22 mg/dL    Creatinine 0.60 0.60 - 1.10 mg/dL    GFR MDRD Af Amer >60 >60 mL/min/1.73m2    GFR MDRD Non Af Amer >60 >60 mL/min/1.73m2    Bilirubin, Total 0.3 0.0 - 1.0 mg/dL    Calcium 9.0 8.5 - 10.5 mg/dL    Protein, Total 7.2 6.0 - 8.0 g/dL    Albumin 3.8 3.5 - 5.0 g/dL    Alkaline Phosphatase 98 45 - 120 U/L    AST 24 0 - 40 U/L    ALT 21 0 - 45 U/L    Narrative    Fasting Glucose reference range is 70-99 mg/dL per  American Diabetes Association (ADA) guidelines.   Lipid Cascade   Result Value Ref Range    Cholesterol 163 <=199 mg/dL    Triglycerides 385 (H) <=149 mg/dL    HDL Cholesterol 40 (L) >=50 mg/dL    LDL Calculated 46 <=129 mg/dL    Patient Fasting > 8hrs? No        Your  kidney and liver tests are good.   Your cholesterol is better than last time.   Let's make no changes at this time.  I will see you in 3 months.     Please call with questions or contact us using [employer].    Sincerely,        Electronically signed by Charles Eddy MD

## 2021-06-19 NOTE — LETTER
Letter by Charles Eddy MD at      Author: Charles Eddy MD Service: -- Author Type: --    Filed:  Encounter Date: 6/11/2019 Status: (Other)         Keshia Rivero  [address] [facility] [address]             June 11, 2019         Dear Ms. Rivero,    Below are the results from your recent visit:    Resulted Orders   CT Abdomen Pelvis Without Oral With IV Contrast    Narrative    EXAM: CT ABDOMEN PELVIS WO ORAL W IV CONTRAST  LOCATION: [facility]  DATE/TIME: 6/10/2019 8:14 AM    INDICATION: LLQ pain, suspect diverticulitis for more than one month.  COMPARISON: None.  TECHNIQUE: Helical enhanced thin-section CT scan of the abdomen and pelvis was performed following injection of IV contrast. Multiplanar reformats were obtained. Dose reduction techniques were used.  CONTRAST: Iohexol (Omni) 100 mL    FINDINGS:   LUNG BASES: Negative.    ABDOMEN: Normal liver, spleen, kidneys, pancreas, and adrenal glands. No lymphadenopathy.    PELVIS: A few scattered diverticula in the colon but nothing for diverticulitis. No pelvic masses. No free fluid.    MUSCULOSKELETAL: Mild primary degenerative arthritis in the facet joints. Otherwise negative.      Impression    CONCLUSION:   1.  No significant findings to explain the patient's pain.         Your CT scan is normal.     Please call with questions or contact us using [employer].    Sincerely,        Electronically signed by Charles Eddy MD

## 2021-06-19 NOTE — PROGRESS NOTES
MTM Initial Encounter  Assessment & Plan                                                     1. Type 2 diabetes mellitus treated with insulin (H)  We discussed that her diabetes was not under ideal control, and that with basal insulin alone, there is a high likelihood that her blood sugar is much higher in the evening.  This is confirmed by one glucose reading at 4:30 of 320mg/dl and Keshia's report of polyuria in the evening.  We also discussed her microalbuminuria findings from last September and how getting her blood sugar under better control will help to prevent this.    She would like to defer getting an A1c until her next appointment.  We will repeat a microalbumin at that time as well.  She would also like to reschedule her appointment with Dr. Eddy next month which was for diabetes since she was seen today for the same reason.  - start glipizide XL 10mg daily    2. Hyperlipidemia, unspecified hyperlipidemia type  Controlled with use of simvastatin.  - Continue current therapy     3. Vitamin D deficiency  Taking Vit D per report and requesting refills.  - Continue current therapy     4. Encounter for herb and vitamin supplement management  Discussed the probable benefits of glucosamine and fish oil.  I was able to find some evidence of fish oil being helpful in preventing diabetic retinopathy, but only when gotten from oily fishes.  The benefit was not demonstrated from other sources.  - Continue current therapy     5. Cough  I have provided Keshia with one refill of Tessalon pearles per her request.  I told her I was not able to give more as a persistent cough would need to be evaluated.  I have also provided her with refills on all her other medications per her request.  Refills:  Benzonatate x1  Aspirin, lancets, vitamin D, fish oil, basaglar, simvastatin x 1 yr    Follow Up  Return in about 3 months (around 11/6/2018) for diabetes.      Subjective & Objective                                                      Keshia Rivero is a 63 y.o. female coming in for an initial visit for Medication Therapy Management. She was referred to me from Chalres Eddy MD. An  was not available for today's visit, so her daughter Vee  for Keshia.    Chief Complaint: A1c >8%    Medication Adherence/Access: Keshia brings in aspirin and simvastatin -the only two medications she is using right now.    Diabetes: Basaglar 26 units at bedtime  When Keshia increased her insulin to 26 units, she felt like her diabetes is under better control.  Lab Results   Component Value Date    HGBA1C 8.1 (H) 03/15/2018    HGBA1C 8.6 (H) 09/14/2017    HGBA1C 6.7 (H) 12/30/2016     Lab Results   Component Value Date    MICROALBUR 21.31 (H) 09/14/2017    LDLCALC 76 03/15/2018    CREATININE 0.58 (L) 03/15/2018     Hyperlipidemia: Simvastatin 20mg daily  Lab Results   Component Value Date    CHOL 174 03/15/2018    CHOL 172 09/14/2017    CHOL 235 (H) 12/30/2016     Lab Results   Component Value Date    HDL 50 03/15/2018    HDL 46 (L) 09/14/2017    HDL 41 (L) 12/30/2016     Lab Results   Component Value Date    LDLCALC 76 03/15/2018    LDLCALC 81 09/14/2017    LDLCALC 115 12/30/2016     Lab Results   Component Value Date    TRIG 240 (H) 03/15/2018    TRIG 224 (H) 09/14/2017    TRIG 397 (H) 12/30/2016     No components found for: CHOLHDL    Cough:  Keshia's cough resolved with the use of benzonatate, this medication works well for her.  Keshia's cough is worse with cold weather.  She would like a refill of the Tessalon perles for the next time she gets a cough this fall..    Hypovitaminosis D: Cholecalciferol 5,000  Keshia is taking her vitamin D every day, but she does not have it with her today and would like to have refills added.  Vitamin D, Total (25-Hydroxy)   Date Value Ref Range Status   03/15/2018 28.8 (L) 30.0 - 80.0 ng/mL Final     Supplements: Fish oil 1000mg once daily, glucosamine 1500mg once daily  Keshia has been taking glucosamine for 8 years.  She  "feels this is helping her joints.  She wonders if her fish oil is helping to prevent diabetic eye problems.    PMH: reviewed in EPIC   Allergies/ADRs: reviewed per patient   Alcohol: reviewed in EPIC   Tobacco:   History   Smoking Status     Never Smoker   Smokeless Tobacco     Never Used     Today's Vitals:   Vitals:    08/06/18 0815   BP: 112/82   Pulse: 68   Weight: 139 lb 9 oz (63.3 kg)     ----------------    Much or all of the text in this note was generated through the use of Dragon Dictate voice-to-text software. Errors in spelling or words which seem out of context are unintentional. Sound alike errors, in particular, may have escaped editing.    The patient was given a summary of these recommendations as an after visit summary    I spent 60 minutes with this patient today;  All changes were made via collaborative practice agreement with Charles Eddy MD. A copy of the visit note was provided to the patient's provider.     Dandy Thomas, PharmD, BCACP  MTM Pharmacist at [facility]       Current Outpatient Prescriptions   Medication Sig Dispense Refill     ACCU-CHEK GRETA PLUS METER Misc USE TO TEST BLOOD GLUCOSE 1 each 0     ACCU-CHEK GRETA PLUS TEST STRP strips USE ONCE DAILY AS NEEDED 100 strip 5     aspirin 81 MG EC tablet Take 1 tablet (81 mg total) by mouth daily. 90 tablet 3     BD ULTRA-FINE DONTAE PEN NEEDLES 32 gauge x 5/32\" Ndle USE WITH INSULIN ONCE DAILY 100 each 3     benzonatate (TESSALON) 200 MG capsule Take 1 capsule (200 mg total) by mouth 3 (three) times a day as needed for cough. 30 capsule 0     cholecalciferol, vitamin D3, 5,000 unit capsule Take 1 capsule (5,000 Units total) by mouth daily. 90 capsule 3     generic lancets (MICROLET LANCET) USE TO TEST TWO TIMES A  each 10     glipiZIDE (GLUCOTROL XL) 10 MG 24 hr tablet Take 1 tablet (10 mg total) by mouth daily. 30 tablet 3     GLUCOSAMINE HCL ORAL Take 1 tablet by mouth daily.       insulin glargine " (BASAGLAR KWIKPEN U-100 INSULIN) 100 unit/mL (3 mL) pen Inject 26 Units under the skin at bedtime. 5 adj dose pen 3     salmon oil-omega-3 fatty acids 1,000-200 mg cap Take 1 capsule by mouth daily. 90 each 3     simvastatin (ZOCOR) 20 MG tablet Take 1 tablet (20 mg total) by mouth at bedtime. 90 tablet 3     No current facility-administered medications for this visit.

## 2021-06-20 NOTE — LETTER
Letter by Charles Eddy MD at      Author: Charles Eddy MD Service: -- Author Type: --    Filed:  Encounter Date: 2/28/2020 Status: (Other)         Keshia Rivero  [address] [facility] [address]             March 3, 2020         Dear Ms. Rivero,    Below are the results from your recent visit:    Resulted Orders   Glycosylated Hemoglobin A1c   Result Value Ref Range    Hemoglobin A1c 7.4 (H) 3.5 - 6.0 %   Comprehensive Metabolic Panel   Result Value Ref Range    Sodium 140 136 - 145 mmol/L    Potassium 4.0 3.5 - 5.0 mmol/L    Chloride 105 98 - 107 mmol/L    CO2 24 22 - 31 mmol/L    Anion Gap, Calculation 11 5 - 18 mmol/L    Glucose 179 (H) 70 - 125 mg/dL    BUN 12 8 - 22 mg/dL    Creatinine 0.64 0.60 - 1.10 mg/dL    GFR MDRD Af Amer >60 >60 mL/min/1.73m2    GFR MDRD Non Af Amer >60 >60 mL/min/1.73m2    Bilirubin, Total 0.3 0.0 - 1.0 mg/dL    Calcium 9.4 8.5 - 10.5 mg/dL    Protein, Total 7.0 6.0 - 8.0 g/dL    Albumin 3.8 3.5 - 5.0 g/dL    Alkaline Phosphatase 103 45 - 120 U/L    AST 18 0 - 40 U/L    ALT 17 0 - 45 U/L    Narrative    Fasting Glucose reference range is 70-99 mg/dL per  American Diabetes Association (ADA) guidelines.   Thyroid Stimulating Hormone (TSH)   Result Value Ref Range    TSH 0.92 0.30 - 5.00 uIU/mL   Vitamin D, Total (25-Hydroxy)   Result Value Ref Range    Vitamin D, Total (25-Hydroxy) 13.3 (L) 30.0 - 80.0 ng/mL    Narrative    Deficiency <10.0 ng/mL  Insufficiency 10.0-29.9 ng/mL  Sufficiency 30.0-80.0 ng/mL  Toxicity (possible) >100.0 ng/mL       Your vitamin D is low. Please continue taking your daily vitamin D. We will recheck when you come back.    Please call with questions or contact us using [employer].    Sincerely,        Electronically signed by Charles Eddy MD

## 2021-06-20 NOTE — LETTER
Letter by Charles Eddy MD at      Author: Charles Eddy MD Service: -- Author Type: --    Filed:  Encounter Date: 2/28/2020 Status: (Other)         Keshia Rivero  [address] [facility] [address]             February 28, 2020         Dear Ms. Rivero,    Below are the results from your recent visit:    Resulted Orders   Glycosylated Hemoglobin A1c   Result Value Ref Range    Hemoglobin A1c 7.4 (H) 3.5 - 6.0 %   Comprehensive Metabolic Panel   Result Value Ref Range    Sodium 140 136 - 145 mmol/L    Potassium 4.0 3.5 - 5.0 mmol/L    Chloride 105 98 - 107 mmol/L    CO2 24 22 - 31 mmol/L    Anion Gap, Calculation 11 5 - 18 mmol/L    Glucose 179 (H) 70 - 125 mg/dL    BUN 12 8 - 22 mg/dL    Creatinine 0.64 0.60 - 1.10 mg/dL    GFR MDRD Af Amer >60 >60 mL/min/1.73m2    GFR MDRD Non Af Amer >60 >60 mL/min/1.73m2    Bilirubin, Total 0.3 0.0 - 1.0 mg/dL    Calcium 9.4 8.5 - 10.5 mg/dL    Protein, Total 7.0 6.0 - 8.0 g/dL    Albumin 3.8 3.5 - 5.0 g/dL    Alkaline Phosphatase 103 45 - 120 U/L    AST 18 0 - 40 U/L    ALT 17 0 - 45 U/L    Narrative    Fasting Glucose reference range is 70-99 mg/dL per  American Diabetes Association (ADA) guidelines.   Thyroid Stimulating Hormone (TSH)   Result Value Ref Range    TSH 0.92 0.30 - 5.00 uIU/mL       Your kidney and liver test are good.    Please call with questions or contact us using [employer].    Sincerely,        Electronically signed by Charles Eddy MD

## 2021-06-20 NOTE — LETTER
Letter by Charles Eddy MD at      Author: Charles Eddy MD Service: -- Author Type: --    Filed:  Encounter Date: 10/13/2020 Status: (Other)

## 2021-06-21 NOTE — PROGRESS NOTES
ASSESMENT AND PLAN:  Diagnoses and all orders for this visit:    Type 2 diabetes mellitus treated with insulin (H)  -     Glycosylated Hemoglobin A1c  -     Comprehensive Metabolic Panel  -     Microalbumin, Random Urine  -     metFORMIN (GLUCOPHAGE XR) 500 MG 24 hr tablet; Take 1 tablet (500 mg total) by mouth daily with breakfast.  Dispense: 30 tablet; Refill: 1  -     glipiZIDE (GLUCOTROL XL) 10 MG 24 hr tablet; Take 1 tablet (10 mg total) by mouth daily.  Dispense: 30 tablet; Refill: 3  -     blood glucose test (ACCU-CHEK GRETA PLUS TEST STRP) strips; USE ONCE DAILY AS NEEDED.  Dispense: 100 strip; Refill: 11  -     generic lancets (MICROLET LANCET); USE TO TEST TWO TIMES A DAY.  Dispense: 100 each; Refill: 11  Added to Metformin extended release.  Potential side effects discussed including abdominal pain and diarrhea.  Advised to call if develops side effects.  Continue to monitor blood sugar daily and follow-up in 6 weeks.    Hyperlipidemia, unspecified hyperlipidemia type  -     Lipid Cascade  Continue current medication.    Left upper quadrant pain  -     US Abdomen Complete; Future; Expected date: 11/14/2018  Has history in the past.  Ultrasound as above.    Need for influenza vaccination  -     Influenza, Seasonal Quad, Preservative Free 36+ Months    Cough  -     benzonatate (TESSALON) 200 MG capsule; Take 1 capsule (200 mg total) by mouth 3 (three) times a day as needed for cough.  Dispense: 30 capsule; Refill: 0  Refilled med to use as needed.  No acute URI symptoms today.      SUBJECTIVE: Keshia Rivero is a 63-year-old female with history of diabetes and hyperlipidemia here for follow-up.  She is currently using Lantus 24 units and glipizide 10 mg daily.  There was questionable nausea with metformin in the past, but patient states she did not recall taking metformin in the past.  Kidney function has been normal so far.  She did not bring her meter or blood sugar log today but states her fasting blood  sugar numbers are in the 110-140s.  No low blood sugar with hypoglycemic symptoms.  She denied chest pain, shortness of breath or palpitations today.    She took Tessalon for cough in the past.  States she gets mild cough usually in winter months.  She is asking for refill.    She has been having left upper quadrant and left lower quadrant pain on and off for the past 2 weeks.  No diarrhea or constipation.  No dysuria, frequency or blood in the urine.  No fever or fatigue.  She has this problem in the past also.  She will to have ultrasound done.  She denied nausea or vomiting.    Past Medical History:   Diagnosis Date     Diabetes mellitus (H)      Fibrocystic breast      Hyperlipidemia      Patient Active Problem List   Diagnosis     Vitamin D Deficiency     Heartburn With Regurgitation     Low back pain     Hyperlipidemia, unspecified hyperlipidemia type     Back pain     Varicose vein of leg     Type 2 diabetes mellitus treated with insulin (H)       Allergies:    Allergies   Allergen Reactions     Glucophage [Metformin] Nausea And Vomiting       Social History     Tobacco Use   Smoking Status Never Smoker   Smokeless Tobacco Never Used       Review of systems otherwise negative except as listed in HPI.   Social History     Tobacco Use   Smoking Status Never Smoker   Smokeless Tobacco Never Used       OBJECTICE: /82 (Patient Site: Right Arm, Patient Position: Sitting, Cuff Size: Adult Regular)   Pulse 72   Temp 98.3  F (36.8  C) (Oral)   Resp 16   Ht 5' (1.524 m)   Wt 143 lb 3 oz (64.9 kg)   BMI 27.96 kg/m      DATA REVIEWED:    Labs Reviewed or Ordered (1):      GEN-alert,  in no apparent distress.  HEENT-mucous membranes are moist, neck is supple.  CV-regular rate and rhythm with no murmur.   RESP-lungs clear to auscultation .  ABDOMEN- Soft , no tenderness today.  EXTREM- No open lesions or ulcers.  Sensation intact.  SKIN-normal    This transcription uses voice recognition software, which may  contain typographical errors.        Charles Eddy   11/14/2018

## 2021-06-21 NOTE — LETTER
Letter by Charles Eddy MD at      Author: Charles Eddy MD Service: -- Author Type: --    Filed:  Encounter Date: 12/31/2020 Status: (Other)         Keshia Rivero  [address] [facility] [address]             January 7, 2021         Dear Ms. Josefa,    Below are the results from your recent visit:    Resulted Orders   Gynecologic Cytology (PAP Smear)   Result Value Ref Range    Case Report       Gynecologic Cytology Report                       Case: [id number]                                   Authorizing Provider:  Charles Eddy MD              Collected:           12/24/2020 0908              Ordering Location:     [facility]   Received:            12/24/2020 0908                                     [place]                                                                     First Screen:          Bhumi Corral, CT                                                                            (ASCP)                                                                       Specimen:    SUREPATH PAP, SCREENING, Endocervical/cervical                                             Interpretation  Negative for squamous intraepithelial lesion or malignancy.      Negative for squamous intraepithelial lesion or malignancy    Result Flag Normal Normal    Specimen Adequacy       Satisfactory for evaluation, endocervical/transformation zone component present    HPV Reflex? Yes regardless of result     HIGH RISK No     LMP/Menopause Date Menopause     Abnormal Bleeding No     Pt Status NA     Birth Control/Hormones None     Previous Normal/Date 2015     Prev Abn Date/Dx none     Cervical Appearance normal    HM2(CBC w/o Differential)   Result Value Ref Range    WBC 7.0 4.0 - 11.0 thou/uL    RBC 4.58 3.80 - 5.40 mill/uL    Hemoglobin 13.8 12.0 - 16.0 g/dL    Hematocrit 41.8 35.0 - 47.0 %    MCV 91 80 - 100 fL    MCH 30.2 27.0 - 34.0 pg    MCHC 33.0 32.0 - 36.0 g/dL    RDW 11.0 11.0 - 14.5 %    Platelets 193 140 - 440  thou/uL    MPV 7.8 7.0 - 10.0 fL   Lipid Cascade RANDOM   Result Value Ref Range    Cholesterol 151 <=199 mg/dL    Triglycerides 208 (H) <=149 mg/dL    HDL Cholesterol 50 >=50 mg/dL    LDL Calculated 59 <=129 mg/dL    Patient Fasting > 8hrs? No    Glycosylated Hemoglobin A1c   Result Value Ref Range    Hemoglobin A1c 6.9 (H) <=5.6 %   HPV High Risk DNA Cervical   Result Value Ref Range    HPV Source SurePath     HPV16 DNA Negative NEG    HPV18 DNA Negative NEG    Other HR HPV Negative NEG    Final Diagnosis SEE NOTES       Comment:      This patient's sample is negative for HPV DNA.  This test was developed and its performance characteristics determined by the  [facility], [facility]. It  has not been cleared or approved by the FDA. The laboratory is regulated under  CLIA as qualified to perform high-complexity testing. This test is used for  clinical purposes. It should not be regarded as investigational or for  research.  (Note)  METHODOLOGY:  The Roche kamaljit 4800 system uses automated extraction,  simultaneous amplification of HPV (L1 region) and beta-globin,  followed by  real time detection of fluorescent labeled HPV and beta  globin using specific oligonucleotide probes . The test specifically  identifies types HPV 16 DNA and HPV 18 DNA while concurrently  detecting the rest of the high risk types (31, 33, 35, 39, 45, 51,  52, 56, 58, 59, 66 or 68).    COMMENTS:  This test is not intended for use as a screening device  for women under age 30 with normal cervical  cytology.  Results should  be correlated with cytologic and histologic findings. Close clinical  followup is recommended.        Specimen Description Cervical Cells       Comment:        Performed and/or entered by:  [address] [address]        Your pap smear is normal.     Please call with questions or contact us using  MyChart.    Sincerely,        Electronically signed by Charles Eddy MD

## 2021-06-21 NOTE — LETTER
Letter by Dee Estrella RN at      Author: Dee Estrella RN Service: -- Author Type: --    Filed:  Encounter Date: 1/7/2021 Status: (Other)         Keshia Rivero  [address] [facility] [address]             January 7, 2021         Dear Ms. Rivero,    We are happy to inform you that your recent Pap smear and Human Papillomavirus (HPV) test results are normal and negative.    Per current guidelines, you no longer need to have Pap smears completed. Please continue to be seen every year for annual wellness visits.    If you have additional questions regarding this result, please contact our office and we will be happy to assist you.      Sincerely,    Your [facility] Team

## 2021-06-21 NOTE — LETTER
Letter by Charles Eddy MD at      Author: Charles Eddy MD Service: -- Author Type: --    Filed:  Encounter Date: 12/31/2020 Status: (Other)         Keshia Rivero  [address] [facility] [address]             December 31, 2020         Dear Ms. Rivero,    Below are the results from your recent visit:    Resulted Orders   HM2(CBC w/o Differential)   Result Value Ref Range    WBC 7.0 4.0 - 11.0 thou/uL    RBC 4.58 3.80 - 5.40 mill/uL    Hemoglobin 13.8 12.0 - 16.0 g/dL    Hematocrit 41.8 35.0 - 47.0 %    MCV 91 80 - 100 fL    MCH 30.2 27.0 - 34.0 pg    MCHC 33.0 32.0 - 36.0 g/dL    RDW 11.0 11.0 - 14.5 %    Platelets 193 140 - 440 thou/uL    MPV 7.8 7.0 - 10.0 fL   Lipid Cascade RANDOM   Result Value Ref Range    Cholesterol 151 <=199 mg/dL    Triglycerides 208 (H) <=149 mg/dL    HDL Cholesterol 50 >=50 mg/dL    LDL Calculated 59 <=129 mg/dL    Patient Fasting > 8hrs? No    Glycosylated Hemoglobin A1c   Result Value Ref Range    Hemoglobin A1c 6.9 (H) <=5.6 %   HPV High Risk DNA Cervical   Result Value Ref Range    HPV Source SurePath     HPV16 DNA Negative NEG    HPV18 DNA Negative NEG    Other HR HPV Negative NEG    Final Diagnosis SEE NOTES       Comment:      This patient's sample is negative for HPV DNA.  This test was developed and its performance characteristics determined by the  [facility], [facility] Diagn[facility] Laboratory. It  has not been cleared or approved by the FDA. The laboratory is regulated under  CLIA as qualified to perform high-complexity testing. This test is used for  clinical purposes. It should not be regarded as investigational or for  research.  (Note)  METHODOLOGY:  The Roche kamaljit 4800 system uses automated extraction,  simultaneous amplification of HPV (L1 region) and beta-globin,  followed by  real time detection of fluorescent labeled HPV and beta  globin using specific oligonucleotide probes . The test specifically  identifies types HPV 16 DNA and HPV 18 DNA  while concurrently  detecting the rest of the high risk types (31, 33, 35, 39, 45, 51,  52, 56, 58, 59, 66 or 68).    COMMENTS:  This test is not intended for use as a screening device  for women under age 30 with normal cervical  cytology.  Results should  be correlated with cytologic and histologic findings. Close clinical  followup is recommended.        Specimen Description Cervical Cells       Comment:        Performed and/or entered by:  [address] [address]        Pap result is still pending.    Please call with questions or contact us using [email or web address].    Sincerely,        Electronically signed by Charles Eddy MD

## 2021-06-22 NOTE — PROGRESS NOTES
ASSESMENT AND PLAN:  Diagnoses and all orders for this visit:    Type 2 diabetes mellitus treated with insulin (H)  -     insulin glargine (BASAGLAR KWIKPEN U-100 INSULIN) 100 unit/mL (3 mL) pen; Inject 26 Units under the skin at bedtime.  Dispense: 5 adj dose pen; Refill: 3  -     metFORMIN (GLUCOPHAGE XR) 500 MG 24 hr tablet; Take 1 tablet (500 mg total) by mouth daily with breakfast.  Dispense: 90 tablet; Refill: 1  -     glipiZIDE (GLUCOTROL XL) 10 MG 24 hr tablet; Take 1 tablet (10 mg total) by mouth daily.  Dispense: 90 tablet; Refill: 1  She is having loose stool with metformin but she wants to continue on it.  Discussed option to increase insulin dose, she is not comfortable going up on insulin since we just added metformin month ago.  Last A1c was 8.1, a month ago.  Continue metformin, continue glipizide and Basaglar follow-up in 2 months.    Abdominal pain, generalized  Discussed ultrasound results with the patient, hepatic steatosis.  Symptom improved.    Hyperlipidemia, unspecified hyperlipidemia type  -     simvastatin (ZOCOR) 20 MG tablet; Take 1 tablet (20 mg total) by mouth at bedtime.  Dispense: 90 tablet; Refill: 3  Last LDL 79 one month ago.    Cough  -     codeine-guaiFENesin (GUAIFENESIN AC)  mg/5 mL liquid; Take 5-10 mL by mouth every 6 (six) hours as needed for cough.  Dispense: 200 mL; Refill: 0  She will call if symptoms persist.      SUBJECTIVE: Keshia Rivero is a 63-year-old female with history of diabetes, hyperlipidemia here to follow-up on abdominal pain.  Ultrasound abdomen done on 11/14/2018 only showed hepatic steatosis.  States her abdominal pain has improved.    Metformin 500 mg extra was added a month ago.  She is also on glipizide 10 mg.  She was on Lantus 26 units prior to adding metformin, she cut back to 16 units after adding metformin.  She was having loose stool once or twice a day since starting on metformin.  She denied abdominal cramping.  States her blood sugar  readings are in the 110-130s, never above 140 since last visit a month ago.    She is complaining of cough for a few weeks.  No fever or chills.  It started with dry cough but now productive.  No shortness of breath or wheezing.  No sore throat, headache or runny nose.    Past Medical History:   Diagnosis Date     Diabetes mellitus (H)      Fibrocystic breast      Hyperlipidemia      Patient Active Problem List   Diagnosis     Vitamin D Deficiency     Heartburn With Regurgitation     Low back pain     Hyperlipidemia, unspecified hyperlipidemia type     Back pain     Varicose vein of leg     Type 2 diabetes mellitus treated with insulin (H)       Allergies:    Allergies   Allergen Reactions     Glucophage [Metformin] Nausea And Vomiting       Social History     Tobacco Use   Smoking Status Never Smoker   Smokeless Tobacco Never Used       Review of systems otherwise negative except as listed in HPI.   Social History     Tobacco Use   Smoking Status Never Smoker   Smokeless Tobacco Never Used       OBJECTICE: /72 (Patient Site: Left Arm, Patient Position: Sitting, Cuff Size: Adult Regular)   Pulse 72   Temp 98.4  F (36.9  C) (Oral)   Resp 16   Ht 5' (1.524 m)   Wt 142 lb 3 oz (64.5 kg)   BMI 27.77 kg/m      DATA REVIEWED:    Labs Reviewed or Ordered (1):       GEN-alert,  in no apparent distress.  HEENT-mucous membranes are moist, neck is supple.  CV-regular rate and rhythm with no murmur.   RESP-lungs clear to auscultation .  ABDOMEN- Soft , not tender.  EXTREM- No open lesions or ulcers.  Sensation intact.  SKIN-normal    This transcription uses voice recognition software, which may contain typographical errors.        Charles Eddy   12/19/2018

## 2021-06-24 ENCOUNTER — COMMUNICATION - HEALTHEAST (OUTPATIENT)
Dept: FAMILY MEDICINE | Facility: CLINIC | Age: 66
End: 2021-06-24

## 2021-06-24 ENCOUNTER — RECORDS - HEALTHEAST (OUTPATIENT)
Dept: MAMMOGRAPHY | Facility: CLINIC | Age: 66
End: 2021-06-24

## 2021-06-24 DIAGNOSIS — Z12.31 ENCOUNTER FOR SCREENING MAMMOGRAM FOR MALIGNANT NEOPLASM OF BREAST: ICD-10-CM

## 2021-06-24 NOTE — PROGRESS NOTES
ASSESMENT AND PLAN:  Diagnoses and all orders for this visit:    Type 2 diabetes mellitus treated with insulin (H)  -     Glycosylated Hemoglobin A1c  -     Comprehensive Metabolic Panel  -     metFORMIN (GLUCOPHAGE XR) 500 MG 24 hr tablet; Take 1 tablet (500 mg total) by mouth daily with breakfast.  Dispense: 90 tablet; Refill: 1  -     aspirin 81 MG EC tablet; Take 1 tablet (81 mg total) by mouth daily.  Dispense: 90 tablet; Refill: 3  -     glipiZIDE (GLUCOTROL XL) 10 MG 24 hr tablet; Take 1 tablet (10 mg total) by mouth daily.  Dispense: 90 tablet; Refill: 1  -     insulin glargine (BASAGLAR KWIKPEN U-100 INSULIN) 100 unit/mL (3 mL) pen; Inject 26 Units under the skin at bedtime.  Dispense: 5 adj dose pen; Refill: 3  Discussed higher dose of metformin but patient prefer to stay at current dose due to loose stools. (She does want to continue on metformin) she will continue metformin 500 mg daily, glipizide 10 mg daily and Lantus 18 units daily.  Follow-up in 3 months.  Daughter will make eye appointment next month.    Hyperlipidemia, unspecified hyperlipidemia type  -     Lipid Cascade  Last LDL was 79 in 11/18.  Recheck today.    Cough  -     codeine-guaiFENesin (GUAIFENESIN AC)  mg/5 mL liquid; Take 5-10 mL by mouth 3 (three) times a day as needed for cough.  Dispense: 240 mL; Refill: 0  -     benzonatate (TESSALON) 200 MG capsule; Take 1 capsule (200 mg total) by mouth 3 (three) times a day as needed for cough.  Dispense: 30 capsule; Refill: 0  Lungs clear on exam.  Symptomatic management with above meds.    Preventative health care  Mammogram done in 11/18.  Colonoscopy in 12/15.  This transcription uses voice recognition software, which may contain typographical errors.        SUBJECTIVE: Keshia Rivero is a 63-year-old female with history of diabetes and hyperlipidemia here for follow-up.  Metformin was added 3 months ago.  She is currently on metformin extended release 500 mg daily, glipizide XL 10 mg  daily and Lantus 18 units daily.  Her blood sugars are in the 90s to low 100s, highest was 123 in the past 2 weeks.  No low blood sugar with hypoglycemic symptoms.  States she is doing well.  She has history of metformin intolerance in the past but doing okay currently with occasional loose stool, but wants to continue on it.      She has been having dry cough lately.  No shortness of breath or wheezing.  No fever.  Cough is worse in the evening time.  No night sweats or weight loss.  Robitussin with codeine worked well within the past and requesting refill.    Past Medical History:   Diagnosis Date     Diabetes mellitus (H)      Fibrocystic breast      Hyperlipidemia      Patient Active Problem List   Diagnosis     Vitamin D Deficiency     Heartburn With Regurgitation     Low back pain     Hyperlipidemia, unspecified hyperlipidemia type     Back pain     Varicose vein of leg     Type 2 diabetes mellitus treated with insulin (H)       Allergies:    Allergies   Allergen Reactions     Glucophage [Metformin] Nausea And Vomiting       Social History     Tobacco Use   Smoking Status Never Smoker   Smokeless Tobacco Never Used       Review of systems otherwise negative except as listed in HPI.   Social History     Tobacco Use   Smoking Status Never Smoker   Smokeless Tobacco Never Used       OBJECTICE: /78 (Patient Site: Left Arm, Patient Position: Sitting, Cuff Size: Adult Regular)   Pulse 72   Temp 97.8  F (36.6  C) (Oral)   Resp 16   Ht 5' (1.524 m)   Wt 142 lb 9 oz (64.7 kg)   BMI 27.84 kg/m      DATA REVIEWED:    Labs Reviewed or Ordered (1):       GEN-alert,  in no apparent distress.  HEENT-mucous membranes are moist, neck is supple.  CV-regular rate and rhythm with no murmur.   RESP-lungs clear to auscultation .  ABDOMEN- Soft , not tender.  EXTREM-no open lesions or ulcers.  Sensation intact.  SKIN-normal        [place] Za   2/19/2019

## 2021-07-04 NOTE — LETTER
Letter by Charles Eddy MD at      Author: Charles Eddy MD Service: -- Author Type: --    Filed:  Encounter Date: 6/24/2021 Status: (Other)         Keshia Rivero  [address] [facility] [address]             June 24, 2021         Dear Ms. Rivero,    Below are the results from your recent visit:    Resulted Orders   Glycosylated Hemoglobin A1c   Result Value Ref Range    Hemoglobin A1c 8.1 (H) <=5.6 %   Lipid Cascade RANDOM   Result Value Ref Range    Cholesterol 147 <=199 mg/dL    Triglycerides 190 (H) <=149 mg/dL    HDL Cholesterol 49 (L) >=50 mg/dL    LDL Calculated 60 <=129 mg/dL    Patient Fasting > 8hrs? No    Comprehensive Metabolic Panel   Result Value Ref Range    Sodium 138 136 - 145 mmol/L    Potassium 4.2 3.5 - 5.0 mmol/L    Chloride 104 98 - 107 mmol/L    CO2 22 22 - 31 mmol/L    Anion Gap, Calculation 12 5 - 18 mmol/L    Glucose 123 70 - 125 mg/dL    BUN 10 8 - 22 mg/dL    Creatinine 0.58 (L) 0.60 - 1.10 mg/dL    GFR MDRD Af Amer >60 >60 mL/min/1.73m2    GFR MDRD Non Af Amer >60 >60 mL/min/1.73m2    Bilirubin, Total 0.6 0.0 - 1.0 mg/dL    Calcium 8.8 8.5 - 10.5 mg/dL    Protein, Total 7.2 6.0 - 8.0 g/dL    Albumin 4.0 3.5 - 5.0 g/dL    Alkaline Phosphatase 100 45 - 120 U/L    AST 39 0 - 40 U/L    ALT 26 0 - 45 U/L    Narrative    Fasting Glucose reference range is 70-99 mg/dL per  American Diabetes Association (ADA) guidelines.     Your labs look good.      Please call with questions or contact us using [email or web address].    Sincerely,        Electronically signed by Charles Eddy MD

## 2021-08-22 DIAGNOSIS — E11.9 TYPE 2 DIABETES MELLITUS TREATED WITH INSULIN (H): Primary | ICD-10-CM

## 2021-08-22 DIAGNOSIS — Z79.4 TYPE 2 DIABETES MELLITUS TREATED WITH INSULIN (H): Primary | ICD-10-CM

## 2021-08-23 RX ORDER — PEN NEEDLE, DIABETIC 32GX 5/32"
NEEDLE, DISPOSABLE MISCELLANEOUS
Qty: 200 EACH | Refills: 11 | Status: SHIPPED | OUTPATIENT
Start: 2021-08-23 | End: 2022-10-20

## 2021-09-22 DIAGNOSIS — Z79.4 TYPE 2 DIABETES MELLITUS TREATED WITH INSULIN (H): Primary | ICD-10-CM

## 2021-09-22 DIAGNOSIS — E11.9 TYPE 2 DIABETES MELLITUS TREATED WITH INSULIN (H): Primary | ICD-10-CM

## 2021-09-23 RX ORDER — ASPIRIN 81 MG/1
TABLET, DELAYED RELEASE ORAL
Qty: 90 TABLET | Refills: 0 | Status: SHIPPED | OUTPATIENT
Start: 2021-09-23 | End: 2023-02-03

## 2021-09-23 NOTE — TELEPHONE ENCOUNTER
"Last Written Prescription Date:  06/24/2021  Last Fill Quantity: 90 tablets,  # refills: 1   Last office visit provider:  Dr. Eddy on 06/24/2021    Per E chart:   Disp Refills Start End JAKE   aspirin 81 MG EC tablet 90 tablet 1 6/24/2021  No   Sig - Route: Take 1 tablet (81 mg total) by mouth daily. - Oral   Sent to pharmacy as: aspirin 81 mg tablet,delayed release   E-Prescribing Status: Receipt confirmed by pharmacy (6/24/2021  8:59 AM CDT)       aspirin 81 MG EC tablet [[id number]]    Electronically signed by: Charles Eddy MD on 06/24/21 0859 Status: Active   Ordering user: Charles Eddy MD 06/24/21 0859 Authorized by: Charles Eddy MD   Frequency: DAILY 06/24/21 - Until Discontinued   Diagnoses  Type 2 diabetes mellitus treated with insulin (H) [E11.9, Z79.4]             Requested Prescriptions   Pending Prescriptions Disp Refills     SM ASPIRIN ADULT LOW STRENGTH 81 MG EC tablet [Pharmacy Med Name: SM Aspirin Adult Low Strength Oral Tablet Delayed Release 81 MG] 90 tablet 0     Sig: Take 1 tablet (81 mg total) by mouth daily.       Analgesics (Non-Narcotic Tylenol and ASA Only) Passed - 9/22/2021 10:05 PM        Passed - Recent (12 mo) or future (30 days) visit within the authorizing provider's specialty     Patient has had an office visit with the authorizing provider or a provider within the authorizing providers department within the previous 12 mos or has a future within next 30 days. See \"Patient Info\" tab in inbasket, or \"Choose Columns\" in Meds & Orders section of the refill encounter.              Passed - Patient is age 20 years or older     If ASA is flagged for ages under 20 years old. Forward to provider for confirmation Ryes Syndrome is not a concern.              Passed - Medication is active on med list             Ana Shaw RN 09/23/21 10:21 AM  "

## 2021-10-25 ENCOUNTER — TRANSFERRED RECORDS (OUTPATIENT)
Dept: HEALTH INFORMATION MANAGEMENT | Facility: CLINIC | Age: 66
End: 2021-10-25

## 2021-10-25 LAB
RETINOPATHY: NEGATIVE
RETINOPATHY: NEGATIVE

## 2021-11-16 ENCOUNTER — OFFICE VISIT (OUTPATIENT)
Dept: FAMILY MEDICINE | Facility: CLINIC | Age: 66
End: 2021-11-16
Payer: COMMERCIAL

## 2021-11-16 VITALS
BODY MASS INDEX: 27.9 KG/M2 | SYSTOLIC BLOOD PRESSURE: 136 MMHG | WEIGHT: 142.13 LBS | HEART RATE: 94 BPM | DIASTOLIC BLOOD PRESSURE: 84 MMHG | HEIGHT: 60 IN | OXYGEN SATURATION: 97 % | TEMPERATURE: 98.2 F

## 2021-11-16 DIAGNOSIS — E78.5 HYPERLIPIDEMIA, UNSPECIFIED HYPERLIPIDEMIA TYPE: ICD-10-CM

## 2021-11-16 DIAGNOSIS — J30.89 SEASONAL ALLERGIC RHINITIS DUE TO OTHER ALLERGIC TRIGGER: ICD-10-CM

## 2021-11-16 DIAGNOSIS — Z79.4 TYPE 2 DIABETES MELLITUS TREATED WITH INSULIN (H): Primary | ICD-10-CM

## 2021-11-16 DIAGNOSIS — M25.562 PAIN IN BOTH KNEES, UNSPECIFIED CHRONICITY: ICD-10-CM

## 2021-11-16 DIAGNOSIS — M25.561 PAIN IN BOTH KNEES, UNSPECIFIED CHRONICITY: ICD-10-CM

## 2021-11-16 DIAGNOSIS — E11.9 TYPE 2 DIABETES MELLITUS TREATED WITH INSULIN (H): Primary | ICD-10-CM

## 2021-11-16 PROCEDURE — 99214 OFFICE O/P EST MOD 30 MIN: CPT | Performed by: FAMILY MEDICINE

## 2021-11-16 PROCEDURE — 99207 PR FOOT EXAM NO CHARGE: CPT | Performed by: FAMILY MEDICINE

## 2021-11-16 RX ORDER — GLIPIZIDE 10 MG/1
10 TABLET, FILM COATED, EXTENDED RELEASE ORAL DAILY
Qty: 90 TABLET | Refills: 1 | Status: SHIPPED | OUTPATIENT
Start: 2021-11-16 | End: 2022-05-17

## 2021-11-16 RX ORDER — FLUTICASONE PROPIONATE 50 MCG
1 SPRAY, SUSPENSION (ML) NASAL DAILY
Qty: 16 G | Refills: 4 | Status: SHIPPED | OUTPATIENT
Start: 2021-11-16 | End: 2022-05-17

## 2021-11-16 RX ORDER — METFORMIN HCL 500 MG
500 TABLET, EXTENDED RELEASE 24 HR ORAL
Qty: 90 TABLET | Refills: 1 | Status: SHIPPED | OUTPATIENT
Start: 2021-11-16 | End: 2022-06-26

## 2021-11-16 RX ORDER — SIMVASTATIN 20 MG
20 TABLET ORAL AT BEDTIME
Qty: 90 TABLET | Refills: 1 | Status: SHIPPED | OUTPATIENT
Start: 2021-11-16 | End: 2022-05-17

## 2021-11-16 ASSESSMENT — MIFFLIN-ST. JEOR: SCORE: 1112.36

## 2021-11-16 NOTE — PROGRESS NOTES
ASSESMENT AND PLAN:  Type 2 diabetes mellitus treated with insulin (H)  Patient declined labs today.  We'll continue current medications.  - FOOT EXAM  - aspirin (ASA) 81 MG EC tablet; Take 1 tablet (81 mg) by mouth daily  - glipiZIDE (GLUCOTROL XL) 10 MG 24 hr tablet; Take 1 tablet (10 mg) by mouth daily  - insulin glargine (LANTUS PEN) 100 UNIT/ML pen; Inject 26 Units Subcutaneous At Bedtime  - metFORMIN (GLUCOPHAGE-XR) 500 MG 24 hr tablet; Take 1 tablet (500 mg) by mouth daily (with breakfast)    Hyperlipidemia, unspecified hyperlipidemia type  Declined lab today.  Continue Zocor at current dose.  - simvastatin (ZOCOR) 20 MG tablet; Take 1 tablet (20 mg) by mouth At Bedtime    Seasonal allergic rhinitis due to other allergic trigger  - fluticasone (FLONASE) 50 MCG/ACT nasal spray; Spray 1 spray into both nostrils daily    Pain in both knees, unspecified chronicity  She can take Tylenol as needed.  - diclofenac (VOLTAREN) 1 % topical gel; Apply 2 g topically 4 times daily    This transcription uses voice recognition software, which may contain typographical errors.      SUBJECTIVE: Keshia Rivero is a very pleasant 66-year-old female with history of diabetes, hyperlipidemia and chronic knee pain here for follow-up.  She uses Lantus 26 units at bedtime, Metformin  mg daily and glipizide XL 10 mg daily.  She did not bring her meter but states her blood sugar ranges from 100s to the high as 145.  Last A1c was 8.1 in 6/21.  No low blood sugar with hypoglycemic symptoms.She takes Zocor for lipids.  No side effects reported.  She reports sneezing, plugged nose in the winter months and requesting nasal spray.  No acute symptoms today.  She has chronic mild bilateral knee pain.  She takes Tylenol as needed.  No swelling or redness in the knees.  No acute worsening knee pain.  No known exposure to COVID-19.  No COVID-19 symptoms.    No acute chest pain, shortness of breath or palpitations.    Past Medical History:  "  Diagnosis Date     Diabetes mellitus (H)      Fibrocystic breast      Hyperlipidemia      Patient Active Problem List   Diagnosis     Vitamin D Deficiency     Heartburn With Regurgitation     Low back pain     Hyperlipidemia, unspecified hyperlipidemia type     Back pain     Varicose vein of leg     Type 2 diabetes mellitus treated with insulin (H)     Left knee pain, unspecified chronicity     Not immune to hepatitis B virus       Allergies:    Allergies   Allergen Reactions     Glucophage [Metformin] Nausea and Vomiting       History   Smoking Status     Never Smoker   Smokeless Tobacco     Never Used       Review of systems otherwise negative except as listed in HPI.   History   Smoking Status     Never Smoker   Smokeless Tobacco     Never Used       OBJECTICE: /84   Pulse 94   Temp 98.2  F (36.8  C) (Oral)   Ht 1.518 m (4' 11.76\")   Wt 64.5 kg (142 lb 2 oz)   SpO2 97%   BMI 27.98 kg/m      DATA REVIEWED:  Additional History from Old Records Summarized (2): Eye exam in 10/21.  No diabetic neuropathy.      GEN-alert,  in no apparent distress.  HEENT-mucous membranes are moist, neck is supple.  CV-regular rate and rhythm with no murmur.   RESP-lungs clear to auscultation .  ABDOMEN- Soft , not tender.  EXTREM- No open lesions or ulcers.  Sensation intact.  SKIN-normal        Charles Eddy MD   11/16/2021   "

## 2021-11-17 ENCOUNTER — PATIENT OUTREACH (OUTPATIENT)
Dept: NURSING | Facility: CLINIC | Age: 66
End: 2021-11-17
Payer: COMMERCIAL

## 2021-11-17 NOTE — PROGRESS NOTES
Clinic Care Coordination Enrollment Follow-Up    Spoke with daughter and she states she will help her mom call Senior Linkage Line at [phone] or they will go online to sign up for Medicare.    [facility] SW does not assist with Medicare enrollment as they are not trained to complete Medicare enrollment applications.    No other questions or concerns.

## 2022-05-17 ENCOUNTER — OFFICE VISIT (OUTPATIENT)
Dept: FAMILY MEDICINE | Facility: CLINIC | Age: 67
End: 2022-05-17
Payer: COMMERCIAL

## 2022-05-17 VITALS
WEIGHT: 139.19 LBS | HEART RATE: 73 BPM | TEMPERATURE: 98.2 F | BODY MASS INDEX: 27.33 KG/M2 | HEIGHT: 60 IN | SYSTOLIC BLOOD PRESSURE: 128 MMHG | OXYGEN SATURATION: 99 % | DIASTOLIC BLOOD PRESSURE: 76 MMHG

## 2022-05-17 DIAGNOSIS — Z78.0 MENOPAUSE: ICD-10-CM

## 2022-05-17 DIAGNOSIS — M25.562 PAIN IN BOTH KNEES, UNSPECIFIED CHRONICITY: ICD-10-CM

## 2022-05-17 DIAGNOSIS — M25.561 PAIN IN BOTH KNEES, UNSPECIFIED CHRONICITY: ICD-10-CM

## 2022-05-17 DIAGNOSIS — E78.5 HYPERLIPIDEMIA, UNSPECIFIED HYPERLIPIDEMIA TYPE: ICD-10-CM

## 2022-05-17 DIAGNOSIS — J30.89 SEASONAL ALLERGIC RHINITIS DUE TO OTHER ALLERGIC TRIGGER: ICD-10-CM

## 2022-05-17 DIAGNOSIS — E11.9 TYPE 2 DIABETES MELLITUS TREATED WITH INSULIN (H): Primary | ICD-10-CM

## 2022-05-17 DIAGNOSIS — Z79.4 TYPE 2 DIABETES MELLITUS TREATED WITH INSULIN (H): Primary | ICD-10-CM

## 2022-05-17 LAB
ALBUMIN SERPL-MCNC: 3.9 G/DL (ref 3.5–5)
ALP SERPL-CCNC: 100 U/L (ref 45–120)
ALT SERPL W P-5'-P-CCNC: 25 U/L (ref 0–45)
ANION GAP SERPL CALCULATED.3IONS-SCNC: 9 MMOL/L (ref 5–18)
AST SERPL W P-5'-P-CCNC: 35 U/L (ref 0–40)
BILIRUB SERPL-MCNC: 0.7 MG/DL (ref 0–1)
BUN SERPL-MCNC: 10 MG/DL (ref 8–22)
CALCIUM SERPL-MCNC: 9.1 MG/DL (ref 8.5–10.5)
CHLORIDE BLD-SCNC: 103 MMOL/L (ref 98–107)
CHOLEST SERPL-MCNC: 160 MG/DL
CO2 SERPL-SCNC: 27 MMOL/L (ref 22–31)
CREAT SERPL-MCNC: 0.66 MG/DL (ref 0.6–1.1)
CREAT UR-MCNC: 67 MG/DL
FASTING STATUS PATIENT QL REPORTED: YES
GFR SERPL CREATININE-BSD FRML MDRD: >90 ML/MIN/1.73M2
GLUCOSE BLD-MCNC: 106 MG/DL (ref 70–125)
HBA1C MFR BLD: 8.6 % (ref 0–5.6)
HDLC SERPL-MCNC: 49 MG/DL
LDLC SERPL CALC-MCNC: 64 MG/DL
MICROALBUMIN UR-MCNC: 25.97 MG/DL (ref 0–1.99)
MICROALBUMIN/CREAT UR: 387.6 MG/G CR
POTASSIUM BLD-SCNC: 4.2 MMOL/L (ref 3.5–5)
PROT SERPL-MCNC: 7.9 G/DL (ref 6–8)
SODIUM SERPL-SCNC: 139 MMOL/L (ref 136–145)
TRIGL SERPL-MCNC: 237 MG/DL

## 2022-05-17 PROCEDURE — 82043 UR ALBUMIN QUANTITATIVE: CPT | Performed by: FAMILY MEDICINE

## 2022-05-17 PROCEDURE — 83036 HEMOGLOBIN GLYCOSYLATED A1C: CPT | Performed by: FAMILY MEDICINE

## 2022-05-17 PROCEDURE — 99214 OFFICE O/P EST MOD 30 MIN: CPT | Performed by: FAMILY MEDICINE

## 2022-05-17 PROCEDURE — 80053 COMPREHEN METABOLIC PANEL: CPT | Performed by: FAMILY MEDICINE

## 2022-05-17 PROCEDURE — 36415 COLL VENOUS BLD VENIPUNCTURE: CPT | Performed by: FAMILY MEDICINE

## 2022-05-17 PROCEDURE — 80061 LIPID PANEL: CPT | Performed by: FAMILY MEDICINE

## 2022-05-17 RX ORDER — FLUTICASONE PROPIONATE 50 MCG
1 SPRAY, SUSPENSION (ML) NASAL DAILY
Qty: 16 G | Refills: 4 | Status: SHIPPED | OUTPATIENT
Start: 2022-05-17 | End: 2022-12-27

## 2022-05-17 RX ORDER — GLIPIZIDE 10 MG/1
10 TABLET, FILM COATED, EXTENDED RELEASE ORAL DAILY
Qty: 90 TABLET | Refills: 1 | Status: SHIPPED | OUTPATIENT
Start: 2022-05-17 | End: 2022-08-25

## 2022-05-17 RX ORDER — SIMVASTATIN 20 MG
20 TABLET ORAL AT BEDTIME
Qty: 90 TABLET | Refills: 1 | Status: SHIPPED | OUTPATIENT
Start: 2022-05-17 | End: 2022-10-13

## 2022-05-17 NOTE — PROGRESS NOTES
ASSESMENT AND PLAN:    Type 2 diabetes mellitus treated with insulin (H)  A1c pending at the time of the visit.  Will increase Lantus if needed.  - Hemoglobin A1c  - Albumin Random Urine Quantitative with Creat Ratio  - Comprehensive metabolic panel (BMP + Alb, Alk Phos, ALT, AST, Total. Bili, TP)  - Lipid panel  - glipiZIDE (GLUCOTROL XL) 10 MG 24 hr tablet; Take 1 tablet (10 mg) by mouth daily  - insulin glargine (LANTUS PEN) 100 UNIT/ML pen; Inject 28 Units Subcutaneous At Bedtime    Hyperlipidemia, unspecified hyperlipidemia type  Recheck today.  - simvastatin (ZOCOR) 20 MG tablet; Take 1 tablet (20 mg) by mouth At Bedtime    Menopause  Agreed to get DEXA scan.  - DEXA HIP/PELVIS/SPINE - Future; Future    Pain in both knees, unspecified chronicity  No worsening pain.  She will continue with Voltaren topical.  - diclofenac (VOLTAREN) 1 % topical gel; Apply 2 g topically 4 times daily    Seasonal allergic rhinitis due to other allergic trigger  - fluticasone (FLONASE) 50 MCG/ACT nasal spray; Spray 1 spray into both nostrils daily    This transcription uses voice recognition software, which may contain typographical errors.      SUBJECTIVE: Keshia Rivero is here for follow up.  She is here with her daughter.  Her fasting blood sugar are in the 20s to 130s most days.  The highest was 180 but patient stated she skipped her Lantus the night before on purpose to see what her blood sugar will be without insulin.  She takes metformin  mg daily, glipizide XL 10 mg daily and insulin 24 units daily, it was prescribed to take 26 units daily.  No low blood sugar with hypoglycemic symptoms reported.  She takes all her medications without difficulties.  She needs her nasal spray referral.  Knee pain is stable, needs topical pain cream refill.  She just turned 67, is in the process of applying for Medicare.  She plans to continue working for few more years.      Past Medical History:   Diagnosis Date     Fibrocystic breast  "     Patient Active Problem List   Diagnosis     Vitamin D Deficiency     Heartburn With Regurgitation     Low back pain     Hyperlipidemia, unspecified hyperlipidemia type     Back pain     Varicose vein of leg     Type 2 diabetes mellitus treated with insulin (H)     Not immune to hepatitis B virus       Allergies:    Allergies   Allergen Reactions     Glucophage [Metformin] Nausea and Vomiting       History   Smoking Status     Never Smoker   Smokeless Tobacco     Never Used       Review of systems otherwise negative except as listed in HPI.   History   Smoking Status     Never Smoker   Smokeless Tobacco     Never Used       OBJECTICE: /76   Pulse 73   Temp 98.2  F (36.8  C) (Oral)   Ht 1.518 m (4' 11.76\")   Wt 63.1 kg (139 lb 3 oz)   SpO2 99%   BMI 27.40 kg/m      DATA REVIEWED:    Labs Reviewed or Ordered (1):       GEN-alert,  in no apparent distress.  HEENT-mucous membranes are moist, neck is supple.  CV-regular rate and rhythm with no murmur.   RESP-lungs clear to auscultation .  ABDOMEN- Soft , not tender.  EXTREM- KNEES- No effusion, swelling or tenderness today  SKIN-normal        Charles Eddy MD   5/17/2022   "

## 2022-05-18 ENCOUNTER — TELEPHONE (OUTPATIENT)
Dept: FAMILY MEDICINE | Facility: CLINIC | Age: 67
End: 2022-05-18
Payer: COMMERCIAL

## 2022-05-20 NOTE — TELEPHONE ENCOUNTER
VISIT FACILITATOR left message x 1. Please review message thread below and advise the patient as indicated. Please schedule if necessary or indicated in message thread.

## 2022-05-23 NOTE — TELEPHONE ENCOUNTER
The patient verbalizes understanding of provider/CSS instructions for follow-up and continued care per provider message.     Daughter Vy (GLENIS on file) notified per MD note below.

## 2022-06-25 DIAGNOSIS — E11.9 TYPE 2 DIABETES MELLITUS TREATED WITH INSULIN (H): ICD-10-CM

## 2022-06-25 DIAGNOSIS — Z79.4 TYPE 2 DIABETES MELLITUS TREATED WITH INSULIN (H): ICD-10-CM

## 2022-06-26 RX ORDER — METFORMIN HCL 500 MG
TABLET, EXTENDED RELEASE 24 HR ORAL
Qty: 90 TABLET | Refills: 3 | Status: SHIPPED | OUTPATIENT
Start: 2022-06-26 | End: 2022-12-27

## 2022-06-26 RX ORDER — BLOOD SUGAR DIAGNOSTIC
STRIP MISCELLANEOUS
Qty: 300 STRIP | Refills: 3 | Status: SHIPPED | OUTPATIENT
Start: 2022-06-26

## 2022-06-26 RX ORDER — LANCETS
EACH MISCELLANEOUS
Qty: 300 EACH | Refills: 3 | Status: SHIPPED | OUTPATIENT
Start: 2022-06-26

## 2022-06-26 NOTE — TELEPHONE ENCOUNTER
"Last Written Prescription Date:  11/16/21  Last Fill Quantity: 90,  # refills: 1   Last office visit provider:  5/17/22     Requested Prescriptions   Pending Prescriptions Disp Refills     metFORMIN (GLUCOPHAGE XR) 500 MG 24 hr tablet [Pharmacy Med Name: metFORMIN HCl ER Oral Tablet Extended Release 24 Hour 500 MG] 90 tablet 0     Sig: Take 1 tablet (500 mg) by mouth daily with breakfast       Biguanide Agents Passed - 6/25/2022  7:25 PM        Passed - Patient is age 10 or older        Passed - Patient has documented A1c within the specified period of time.     If HgbA1C is 8 or greater, it needs to be on file within the past 3 months.  If less than 8, must be on file within the past 6 months.     Recent Labs   Lab Test 05/17/22  0757   A1C 8.6*             Passed - Patient's CR is NOT>1.4 OR Patient's EGFR is NOT<45 within past 12 mos.     Recent Labs   Lab Test 05/17/22 0757 06/24/21  1051   GFRESTIMATED >90 >60   GFRESTBLACK  --  >60       Recent Labs   Lab Test 05/17/22  0757   CR 0.66             Passed - Patient does NOT have a diagnosis of CHF.        Passed - Medication is active on med list        Passed - Patient is not pregnant        Passed - Patient has not had a positive pregnancy test within the past 12 mos.         Passed - Recent (6 mo) or future (30 days) visit within the authorizing provider's specialty     Patient had office visit in the last 6 months or has a visit in the next 30 days with authorizing provider or within the authorizing provider's specialty.  See \"Patient Info\" tab in inbasket, or \"Choose Columns\" in Meds & Orders section of the refill encounter.               blood glucose monitoring (SOFTCLIX) lancets [Pharmacy Med Name: Accu-Chek Softclix Lancets Miscellaneous] 300 each 0     Sig: USE AS DIRECTED DAILY       Diabetic Supplies Protocol Passed - 6/25/2022  7:25 PM        Passed - Medication is active on med list        Passed - Patient is 18 years of age or older        Passed " "- Recent (6 mo) or future (30 days) visit within the authorizing provider's specialty     Patient had office visit in the last 6 months or has a visit in the next 30 days with authorizing provider.  See \"Patient Info\" tab in inbasket, or \"Choose Columns\" in Meds & Orders section of the refill encounter.               ACCU-CHEK GUIDE test strip [Pharmacy Med Name: Accu-Chek Guide In Vitro Strip]  0     Sig: USE 1 TO TEST BLOOD SUGAR AS NEEDED       Diabetic Supplies Protocol Passed - 6/25/2022  7:25 PM        Passed - Medication is active on med list        Passed - Patient is 18 years of age or older        Passed - Recent (6 mo) or future (30 days) visit within the authorizing provider's specialty     Patient had office visit in the last 6 months or has a visit in the next 30 days with authorizing provider.  See \"Patient Info\" tab in inbasket, or \"Choose Columns\" in Meds & Orders section of the refill encounter.                 Unique Caldwell 06/26/22 9:12 AM  "

## 2022-06-27 ENCOUNTER — TELEPHONE (OUTPATIENT)
Dept: FAMILY MEDICINE | Facility: CLINIC | Age: 67
End: 2022-06-27

## 2022-06-27 NOTE — TELEPHONE ENCOUNTER
Reason for Call:  Other prescription    Detailed comments: Faisal from [facility] pharmacy called and need clarification on diabetic supplies. Per Faisal, pt stated that she should be testing BID and not once a day? Faisal also need to know what brand of diabetic supplies pt should be on and need clarification. Please follow back up with [facility] pharmacy at your earliest convenient at ph#[phone]. Thank you.     Phone Number Patient can be reached at: Home number on file [phone] (home)    Best Time: anytime     Can we leave a detailed message on this number? YES    Call taken on 6/27/2022 at 2:34 PM by Junie Ann

## 2022-06-27 NOTE — TELEPHONE ENCOUNTER
Chart reviewed. Please review findings below.     SUBJECTIVE: Keshia Rivero is here for follow up.  She is here with her daughter.  Her fasting blood sugar are in the 20s to 130s most days.  The highest was 180 but patient stated she skipped her Lantus the night before on purpose to see what her blood sugar will be without insulin.  She takes metformin  mg daily, glipizide XL 10 mg daily and insulin 24 units daily, it was prescribed to take 26 units daily.  No low blood sugar with hypoglycemic symptoms reported.    Medication review shows orders for which the frequency of testing is both once AND twice daily. Patient has been using ACCU-CHEK brand.     Author will route to covering provider to clarify.

## 2022-08-09 DIAGNOSIS — Z76.0 ENCOUNTER FOR MEDICATION REFILL: Primary | ICD-10-CM

## 2022-08-09 DIAGNOSIS — E11.9 TYPE 2 DIABETES MELLITUS TREATED WITH INSULIN (H): ICD-10-CM

## 2022-08-09 DIAGNOSIS — Z79.4 TYPE 2 DIABETES MELLITUS TREATED WITH INSULIN (H): ICD-10-CM

## 2022-08-09 RX ORDER — ASPIRIN 81 MG/1
TABLET, COATED ORAL
Qty: 90 TABLET | Refills: 3 | Status: SHIPPED | OUTPATIENT
Start: 2022-08-09 | End: 2024-07-01

## 2022-08-24 DIAGNOSIS — Z76.0 ENCOUNTER FOR MEDICATION REFILL: Primary | ICD-10-CM

## 2022-08-24 DIAGNOSIS — Z79.4 TYPE 2 DIABETES MELLITUS TREATED WITH INSULIN (H): ICD-10-CM

## 2022-08-24 DIAGNOSIS — E11.9 TYPE 2 DIABETES MELLITUS TREATED WITH INSULIN (H): ICD-10-CM

## 2022-08-25 RX ORDER — GLIPIZIDE 10 MG/1
10 TABLET, FILM COATED, EXTENDED RELEASE ORAL DAILY
Qty: 90 TABLET | Refills: 3 | Status: SHIPPED | OUTPATIENT
Start: 2022-08-25 | End: 2022-12-27

## 2022-08-29 ENCOUNTER — HOSPITAL ENCOUNTER (OUTPATIENT)
Dept: BONE DENSITY | Facility: HOSPITAL | Age: 67
Discharge: HOME | End: 2022-08-29
Attending: FAMILY MEDICINE | Admitting: FAMILY MEDICINE
Payer: MEDICARE

## 2022-08-29 DIAGNOSIS — Z78.0 MENOPAUSE: ICD-10-CM

## 2022-08-29 PROCEDURE — 77080 DXA BONE DENSITY AXIAL: CPT

## 2022-09-06 ENCOUNTER — TELEPHONE (OUTPATIENT)
Dept: FAMILY MEDICINE | Facility: CLINIC | Age: 67
End: 2022-09-06

## 2022-09-06 DIAGNOSIS — M81.0 OSTEOPOROSIS, UNSPECIFIED OSTEOPOROSIS TYPE, UNSPECIFIED PATHOLOGICAL FRACTURE PRESENCE: Primary | ICD-10-CM

## 2022-09-06 RX ORDER — ALENDRONATE SODIUM 70 MG/1
70 TABLET ORAL
Qty: 12 TABLET | Refills: 4 | Status: SHIPPED | OUTPATIENT
Start: 2022-09-06 | End: 2022-12-27

## 2022-09-06 NOTE — TELEPHONE ENCOUNTER
Called and spoke to daughter, Tierra Morris (C2C on file) to deliver provider message regarding test result and medication instruction provided.  Family will  medication from pharmacy.  Confirmed upcoming appt with family as well.    SARA RomeroN, RN  [facility]

## 2022-09-06 NOTE — TELEPHONE ENCOUNTER
----- Message from Charles Eddy MD sent at 9/6/2022  7:25 AM CDT -----  Bone density test showed osteoporosis.  High risk for fracture.,  Medication is recommended.  Prescription for Fosamax to take once a week sent to the patient's pharmacy.  Please call the patient to let her know.    Dr. Charles Eddy  9/6/2022 7:25 AM

## 2022-10-11 ENCOUNTER — TELEPHONE (OUTPATIENT)
Dept: FAMILY MEDICINE | Facility: CLINIC | Age: 67
End: 2022-10-11

## 2022-10-11 NOTE — TELEPHONE ENCOUNTER
Prior Authorization Request  Who's requesting:  PHARMACY   Pharmacy Name and Location: [facility] [address] [place] [address] [[address]  Medication Name: glipiZIDE (GLUCOTROL XL) 10 MG 24 hr tablet  Insurance Plan: [employer] [place]  Insurance Member ID Number:  [id number]   CoverMyMeds Key: [id number]  Informed patient that prior authorizations can take up to 10 business days for response:   Yes  Okay to leave a detailed message: No

## 2022-10-12 DIAGNOSIS — E78.5 HYPERLIPIDEMIA, UNSPECIFIED HYPERLIPIDEMIA TYPE: ICD-10-CM

## 2022-10-12 NOTE — TELEPHONE ENCOUNTER
Central Prior Authorization Team   Phone: [phone]    PA Initiation    Medication: glipiZIDE ER 10MG er tablets  Insurance Company: [employer] - Phone [phone] Fax [phone]  Pharmacy Filling the Rx: [facility] [id number]  Filling Pharmacy Phone: [phone]  Filling Pharmacy Fax:    Start Date: 10/12/2022

## 2022-10-13 RX ORDER — SIMVASTATIN 20 MG
20 TABLET ORAL AT BEDTIME
Qty: 90 TABLET | Refills: 0 | Status: SHIPPED | OUTPATIENT
Start: 2022-10-13 | End: 2022-12-27

## 2022-10-13 NOTE — TELEPHONE ENCOUNTER
"Last Written Prescription Date:  5/17/2022  Last Fill Quantity: 90,  # refills: 1   Last office visit provider:  5/17/2022     Requested Prescriptions   Pending Prescriptions Disp Refills     simvastatin (ZOCOR) 20 MG tablet [Pharmacy Med Name: Simvastatin Oral Tablet 20 MG] 90 tablet 0     Sig: Take 1 tablet (20 mg) by mouth At Bedtime       Statins Protocol Passed - 10/12/2022  3:50 PM        Passed - LDL on file in past 12 months     Recent Labs   Lab Test 05/17/22  0757   LDL 64             Passed - No abnormal creatine kinase in past 12 months     No lab results found.             Passed - Recent (12 mo) or future (30 days) visit within the authorizing provider's specialty     Patient has had an office visit with the authorizing provider or a provider within the authorizing providers department within the previous 12 mos or has a future within next 30 days. See \"Patient Info\" tab in inbasket, or \"Choose Columns\" in Meds & Orders section of the refill encounter.              Passed - Medication is active on med list        Passed - Patient is age 18 or older        Passed - No active pregnancy on record        Passed - No positive pregnancy test in past 12 months             Medina Reyes RN 10/13/22 12:25 AM  "

## 2022-10-14 NOTE — TELEPHONE ENCOUNTER
Pharmacy instructed to bill patient's primary insurance plan:    Your request has been n/a   Error.Dismissed.  Error.This [facility] member is enrolled in a limited plan that only covers certain products in the following groups: Part B drugs, anti-psychotic drugs, weight loss drugs, over-the-counter products (OTC's), cough and cold products, and vitamins, which may be subject to prior authorization per formulary coverage criteria. This member does have Medicare Parts A, B, and D but with a different insurer. The requested medication may be covered by the member's other insurer.

## 2022-10-20 DIAGNOSIS — Z79.4 TYPE 2 DIABETES MELLITUS TREATED WITH INSULIN (H): ICD-10-CM

## 2022-10-20 DIAGNOSIS — Z76.0 ENCOUNTER FOR MEDICATION REFILL: Primary | ICD-10-CM

## 2022-10-20 DIAGNOSIS — E11.9 TYPE 2 DIABETES MELLITUS TREATED WITH INSULIN (H): ICD-10-CM

## 2022-10-20 RX ORDER — PEN NEEDLE, DIABETIC 32GX 5/32"
NEEDLE, DISPOSABLE MISCELLANEOUS
Qty: 200 EACH | Refills: 3 | Status: SHIPPED | OUTPATIENT
Start: 2022-10-20 | End: 2022-12-05

## 2022-11-28 ENCOUNTER — TRANSFERRED RECORDS (OUTPATIENT)
Dept: HEALTH INFORMATION MANAGEMENT | Facility: CLINIC | Age: 67
End: 2022-11-28

## 2022-11-28 LAB — RETINOPATHY: POSITIVE

## 2022-12-03 DIAGNOSIS — Z79.4 TYPE 2 DIABETES MELLITUS TREATED WITH INSULIN (H): ICD-10-CM

## 2022-12-03 DIAGNOSIS — E11.9 TYPE 2 DIABETES MELLITUS TREATED WITH INSULIN (H): ICD-10-CM

## 2022-12-03 DIAGNOSIS — Z76.0 ENCOUNTER FOR MEDICATION REFILL: Primary | ICD-10-CM

## 2022-12-06 RX ORDER — PEN NEEDLE, DIABETIC 32GX 5/32"
NEEDLE, DISPOSABLE MISCELLANEOUS
Qty: 200 EACH | Refills: 3 | Status: SHIPPED | OUTPATIENT
Start: 2022-12-06 | End: 2023-12-11

## 2022-12-06 NOTE — TELEPHONE ENCOUNTER
Covering please advise provider is on vacation for 2 wks . Daughter called for update since mom is out of pen needles.      Thank you,  Dee

## 2022-12-27 ENCOUNTER — OFFICE VISIT (OUTPATIENT)
Dept: FAMILY MEDICINE | Facility: CLINIC | Age: 67
End: 2022-12-27
Payer: MEDICARE

## 2022-12-27 VITALS
WEIGHT: 139.5 LBS | TEMPERATURE: 97.6 F | HEIGHT: 60 IN | HEART RATE: 68 BPM | RESPIRATION RATE: 16 BRPM | OXYGEN SATURATION: 97 % | BODY MASS INDEX: 27.39 KG/M2 | SYSTOLIC BLOOD PRESSURE: 118 MMHG | DIASTOLIC BLOOD PRESSURE: 78 MMHG

## 2022-12-27 DIAGNOSIS — Z23 IMMUNIZATION DUE: ICD-10-CM

## 2022-12-27 DIAGNOSIS — J30.89 SEASONAL ALLERGIC RHINITIS DUE TO OTHER ALLERGIC TRIGGER: ICD-10-CM

## 2022-12-27 DIAGNOSIS — Z79.4 TYPE 2 DIABETES MELLITUS TREATED WITH INSULIN (H): Primary | ICD-10-CM

## 2022-12-27 DIAGNOSIS — M81.0 OSTEOPOROSIS, UNSPECIFIED OSTEOPOROSIS TYPE, UNSPECIFIED PATHOLOGICAL FRACTURE PRESENCE: ICD-10-CM

## 2022-12-27 DIAGNOSIS — E78.5 HYPERLIPIDEMIA, UNSPECIFIED HYPERLIPIDEMIA TYPE: ICD-10-CM

## 2022-12-27 DIAGNOSIS — E11.9 TYPE 2 DIABETES MELLITUS TREATED WITH INSULIN (H): Primary | ICD-10-CM

## 2022-12-27 LAB
ALBUMIN SERPL BCG-MCNC: 4.2 G/DL (ref 3.5–5.2)
ALP SERPL-CCNC: 77 U/L (ref 35–104)
ALT SERPL W P-5'-P-CCNC: 30 U/L (ref 10–35)
ANION GAP SERPL CALCULATED.3IONS-SCNC: 13 MMOL/L (ref 7–15)
AST SERPL W P-5'-P-CCNC: ABNORMAL U/L
BILIRUB SERPL-MCNC: 0.6 MG/DL
BUN SERPL-MCNC: 14.5 MG/DL (ref 8–23)
CALCIUM SERPL-MCNC: 9.1 MG/DL (ref 8.8–10.2)
CHLORIDE SERPL-SCNC: 106 MMOL/L (ref 98–107)
CHOLEST SERPL-MCNC: 158 MG/DL
CREAT SERPL-MCNC: 0.42 MG/DL (ref 0.51–0.95)
DEPRECATED HCO3 PLAS-SCNC: 21 MMOL/L (ref 22–29)
GFR SERPL CREATININE-BSD FRML MDRD: >90 ML/MIN/1.73M2
GLUCOSE SERPL-MCNC: 147 MG/DL (ref 70–99)
HBA1C MFR BLD: 8.6 % (ref 0–5.6)
HDLC SERPL-MCNC: 51 MG/DL
LDLC SERPL CALC-MCNC: 69 MG/DL
NONHDLC SERPL-MCNC: 107 MG/DL
POTASSIUM SERPL-SCNC: 4.6 MMOL/L (ref 3.4–5.3)
PROT SERPL-MCNC: 7.6 G/DL (ref 6.4–8.3)
SODIUM SERPL-SCNC: 140 MMOL/L (ref 136–145)
TRIGL SERPL-MCNC: 191 MG/DL

## 2022-12-27 PROCEDURE — 84155 ASSAY OF PROTEIN SERUM: CPT | Performed by: FAMILY MEDICINE

## 2022-12-27 PROCEDURE — 90662 IIV NO PRSV INCREASED AG IM: CPT | Performed by: FAMILY MEDICINE

## 2022-12-27 PROCEDURE — 83036 HEMOGLOBIN GLYCOSYLATED A1C: CPT | Performed by: FAMILY MEDICINE

## 2022-12-27 PROCEDURE — 84075 ASSAY ALKALINE PHOSPHATASE: CPT | Performed by: FAMILY MEDICINE

## 2022-12-27 PROCEDURE — 80061 LIPID PANEL: CPT | Performed by: FAMILY MEDICINE

## 2022-12-27 PROCEDURE — 82040 ASSAY OF SERUM ALBUMIN: CPT | Performed by: FAMILY MEDICINE

## 2022-12-27 PROCEDURE — G0008 ADMIN INFLUENZA VIRUS VAC: HCPCS | Performed by: FAMILY MEDICINE

## 2022-12-27 PROCEDURE — 82247 BILIRUBIN TOTAL: CPT | Performed by: FAMILY MEDICINE

## 2022-12-27 PROCEDURE — 84460 ALANINE AMINO (ALT) (SGPT): CPT | Performed by: FAMILY MEDICINE

## 2022-12-27 PROCEDURE — 36415 COLL VENOUS BLD VENIPUNCTURE: CPT | Performed by: FAMILY MEDICINE

## 2022-12-27 PROCEDURE — 99214 OFFICE O/P EST MOD 30 MIN: CPT | Mod: 25 | Performed by: FAMILY MEDICINE

## 2022-12-27 PROCEDURE — 80048 BASIC METABOLIC PNL TOTAL CA: CPT | Performed by: FAMILY MEDICINE

## 2022-12-27 RX ORDER — ALENDRONATE SODIUM 70 MG/1
70 TABLET ORAL
Qty: 12 TABLET | Refills: 4 | Status: SHIPPED | OUTPATIENT
Start: 2022-12-27 | End: 2024-07-01

## 2022-12-27 RX ORDER — GLIPIZIDE 10 MG/1
10 TABLET, FILM COATED, EXTENDED RELEASE ORAL DAILY
Qty: 90 TABLET | Refills: 3 | Status: SHIPPED | OUTPATIENT
Start: 2022-12-27 | End: 2024-01-29

## 2022-12-27 RX ORDER — SIMVASTATIN 20 MG
20 TABLET ORAL AT BEDTIME
Qty: 90 TABLET | Refills: 0 | Status: SHIPPED | OUTPATIENT
Start: 2022-12-27 | End: 2023-08-15

## 2022-12-27 RX ORDER — METFORMIN HCL 500 MG
TABLET, EXTENDED RELEASE 24 HR ORAL
Qty: 90 TABLET | Refills: 3 | Status: SHIPPED | OUTPATIENT
Start: 2022-12-27 | End: 2023-12-11

## 2022-12-27 RX ORDER — FLUTICASONE PROPIONATE 50 MCG
1 SPRAY, SUSPENSION (ML) NASAL DAILY
Qty: 16 G | Refills: 4 | Status: SHIPPED | OUTPATIENT
Start: 2022-12-27 | End: 2024-07-01

## 2022-12-27 NOTE — PROGRESS NOTES
Type 2 diabetes mellitus treated with insulin (H)  A1c 8.6, was 8.6 in 5/22.  Increase Lantus to 28 units from 24 units.  Continue other oral medications.  - Hemoglobin A1c; Future  - Comprehensive metabolic panel (BMP + Alb, Alk Phos, ALT, AST, Total. Bili, TP); Future  - Lipid panel  - Hemoglobin A1c  - glipiZIDE (GLUCOTROL XL) 10 MG 24 hr tablet; Take 1 tablet (10 mg) by mouth daily  - metFORMIN (GLUCOPHAGE XR) 500 MG 24 hr tablet; Take 1 tablet (500 mg) by mouth daily with breakfast Strength: 500 mg  - insulin glargine (LANTUS PEN) 100 UNIT/ML pen; Inject 28 Units Subcutaneous At Bedtime  - Comprehensive metabolic panel (BMP + Alb, Alk Phos, ALT, AST, Total. Bili, TP)    Hyperlipidemia, unspecified hyperlipidemia type  - simvastatin (ZOCOR) 20 MG tablet; Take 1 tablet (20 mg) by mouth At Bedtime    Osteoporosis, unspecified osteoporosis type, unspecified pathological fracture presence  - alendronate (FOSAMAX) 70 MG tablet; Take 1 tablet (70 mg) by mouth every 7 days Food or drink.  Avoid lying down for 30 minutes after taking the medicine.    Seasonal allergic rhinitis due to other allergic trigger  - fluticasone (FLONASE) 50 MCG/ACT nasal spray; Spray 1 spray into both nostrils daily    Immunization due  We will get hep B third dose at next visit.  - HEPATITIS B VACCINE, ADULT, IM; Future    Subjective   Tram is a 67 year old accompanied by her daughter, presenting for the following health issues:  Follow Up (diabetes)  Blood sugar are mostly in the 140s to 160 range.  Using Lantus 24 units daily along with other oral medications.  No low blood sugar with hypoglycemic symptoms reported.  No medication side effects reported.    Tolerating lipid medication, no side effects.  Allergy symptoms are stable, wants nasal spray refill.  No acute fever or URI symptoms.  No recent history of chest pain, shortness of breath or palpitations.  No new concerns since last visit.    History of Present Illness       Diabetes:   " She presents for follow up of diabetes.  She is checking home blood glucose one time daily. She checks blood glucose before meals.  Blood glucose is never over 200 and never under 70. She is aware of hypoglycemia symptoms including shakiness, dizziness and other. She has no concerns regarding her diabetes at this time.  She is not experiencing numbness or burning in feet, excessive thirst, blurry vision, weight changes or redness, sores or blisters on feet.                   Review of Systems   CONSTITUTIONAL: NEGATIVE for fever, chills, change in weight  RESP: NEGATIVE for significant cough or SOB  CV: NEGATIVE for chest pain, palpitations or peripheral edema      Objective    /78 (BP Location: Left arm, Patient Position: Sitting, Cuff Size: Adult Regular)   Pulse 68   Temp 97.6  F (36.4  C) (Oral)   Resp 16   Ht 1.518 m (4' 11.76\")   Wt 63.3 kg (139 lb 8 oz)   SpO2 97%   BMI 27.46 kg/m    Body mass index is 27.46 kg/m .  Physical Exam   GENERAL: healthy, alert and no distress  NECK: no adenopathy, no asymmetry, masses, or scars and thyroid normal to palpation  RESP: lungs clear to auscultation - no rales, rhonchi or wheezes  CV: regular rate and rhythm, normal S1 S2, no S3 or S4, no murmur, click or rub, no peripheral edema and peripheral pulses strong  ABDOMEN: soft, nontender, no hepatosplenomegaly, no masses and bowel sounds normal  MS: no gross musculoskeletal defects noted, no edema  Diabetic foot exam: normal DP and PT pulses, no trophic changes or ulcerative lesions and normal sensory exam              Prior to immunization administration, verified patients identity using patient s name and date of birth. Please see Immunization Activity for additional information.     Screening Questionnaire for Adult Immunization    Are you sick today?   No   Do you have allergies to medications, food, a vaccine component or latex?   No   Have you ever had a serious reaction after receiving a vaccination?   " No   Do you have a long-term health problem with heart, lung, kidney, or metabolic disease (e.g., diabetes), asthma, a blood disorder, no spleen, complement component deficiency, a cochlear implant, or a spinal fluid leak?  Are you on long-term aspirin therapy?   YEs   Do you have cancer, leukemia, HIV/AIDS, or any other immune system problem?   No   Do you have a parent, brother, or sister with an immune system problem?   No   In the past 3 months, have you taken medications that affect  your immune system, such as prednisone, other steroids, or anticancer drugs; drugs for the treatment of rheumatoid arthritis, Crohn s disease, or psoriasis; or have you had radiation treatments?   No   Have you had a seizure, or a brain or other nervous system problem?   No   During the past year, have you received a transfusion of blood or blood    products, or been given immune (gamma) globulin or antiviral drug?   No   For women: Are you pregnant or is there a chance you could become       pregnant during the next month?   No   Have you received any vaccinations in the past 4 weeks?   No     Immunization questionnaire was positive for at least one answer.  Notified .        Per orders of Dr. Eddy, injections given by Dee Kimbrough. Patient instructed to remain in clinic for 15 minutes afterwards, and to report any adverse reaction to me immediately.       Screening performed by Dee Kimbrough on 12/27/2022 at 9:03 AM.

## 2023-02-02 ENCOUNTER — TELEPHONE (OUTPATIENT)
Dept: FAMILY MEDICINE | Facility: CLINIC | Age: 68
End: 2023-02-02
Payer: MEDICARE

## 2023-02-02 NOTE — TELEPHONE ENCOUNTER
"Patient walked in with daughter, Tierra Morris (C2C on file) to report symptoms of dizzy and headache for the past two days.  BP today in office 172/99.  Further reported blood sugar has been reading staggering the last few days.  Before meal, Bs; after meals, in the 250s.  RN consulted with pcp, Dr. Eddy who recommended patient to be seen by the ER if BG is reading \"HIGH\" on meter.  To clarify with patient and daughter, both reported BGs were as indicated above, not \"high\" as initial report.  Patient/daughter would like an appointment with a pcp the next day.  An appointment made with Dr. Robert for 2/3/2023 with time and date confirmed with daughter.  RN advised patient/daughter to seek ER if severe headache, chest pain/shortness of breath, vision changes of changes in mental status.  Daughter understood recommendation.    SARA RomeroN, RN  [facility]    "

## 2023-02-03 ENCOUNTER — OFFICE VISIT (OUTPATIENT)
Dept: FAMILY MEDICINE | Facility: CLINIC | Age: 68
End: 2023-02-03
Payer: MEDICARE

## 2023-02-03 VITALS
HEART RATE: 85 BPM | WEIGHT: 137.75 LBS | DIASTOLIC BLOOD PRESSURE: 78 MMHG | BODY MASS INDEX: 27.04 KG/M2 | OXYGEN SATURATION: 97 % | RESPIRATION RATE: 20 BRPM | HEIGHT: 60 IN | SYSTOLIC BLOOD PRESSURE: 122 MMHG | TEMPERATURE: 98.5 F

## 2023-02-03 DIAGNOSIS — H66.002 NON-RECURRENT ACUTE SUPPURATIVE OTITIS MEDIA OF LEFT EAR WITHOUT SPONTANEOUS RUPTURE OF TYMPANIC MEMBRANE: Primary | ICD-10-CM

## 2023-02-03 DIAGNOSIS — R05.1 ACUTE COUGH: ICD-10-CM

## 2023-02-03 DIAGNOSIS — Z79.4 TYPE 2 DIABETES MELLITUS TREATED WITH INSULIN (H): ICD-10-CM

## 2023-02-03 DIAGNOSIS — E11.9 TYPE 2 DIABETES MELLITUS TREATED WITH INSULIN (H): ICD-10-CM

## 2023-02-03 PROCEDURE — 99214 OFFICE O/P EST MOD 30 MIN: CPT | Performed by: FAMILY MEDICINE

## 2023-02-03 RX ORDER — AZITHROMYCIN 250 MG/1
TABLET, FILM COATED ORAL
Qty: 6 TABLET | Refills: 0 | Status: SHIPPED | OUTPATIENT
Start: 2023-02-03 | End: 2023-02-08

## 2023-02-03 NOTE — PROGRESS NOTES
"  Assessment & Plan     Non-recurrent acute suppurative otitis media of left ear without spontaneous rupture of tympanic membrane    Likely cause of headache.    - azithromycin (ZITHROMAX) 250 MG tablet  Dispense: 6 tablet; Refill: 0    Type 2 diabetes mellitus treated with insulin (H)    Stable.    - aspirin (SM ASPIRIN ADULT LOW STRENGTH) 81 MG EC tablet  Dispense: 90 tablet; Refill: 3    Acute cough    Improving.    - codeine-guaiFENesin 200-10 MG/5ML LIQD  Dispense: 240 mL; Refill: 1        38 minutes spent on the date of the encounter doing chart review and patient visit regarding otitis media, diabetes, cough.       BMI:   Estimated body mass index is 27.13 kg/m  as calculated from the following:    Height as of this encounter: 1.517 m (4' 11.74\").    Weight as of this encounter: 62.5 kg (137 lb 12 oz).           No follow-ups on file.    Jase Robert MD  [facility] BEATRIZ Schmitt is a 67 year old, presenting for the following health issues:  Dizziness, Headache, and Fatigue      History of Present Illness       Diabetes:   She presents for follow up of diabetes.  She is checking home blood glucose one time daily. She checks blood glucose before meals.  Blood glucose is sometimes over 200 and never under 70. She is aware of hypoglycemia symptoms including shakiness, dizziness, weakness and blurred vision. She is concerned about low blood sugar, several less than 70 in the past few weeks. She is having numbness in feet.         She eats 2-3 servings of fruits and vegetables daily.She consumes 0 sweetened beverage(s) daily.She exercises with enough effort to increase her heart rate 9 or less minutes per day.  She exercises with enough effort to increase her heart rate 3 or less days per week.   She is taking medications regularly.     Headache.    Cough, improving.    She eats 3 meals per day.    Current Outpatient Medications   Medication Sig Dispense Refill     ACCU-CHEK GRETA " "PLUS METER Misc [ACCU-CHEK GRETA PLUS METER MISC] USE TO TEST BLOOD GLUCOSE 1 each 0     ACCU-CHEK GRETA PLUS TEST STRP strips [ACCU-CHEK GRETA PLUS TEST STRP STRIPS] USE 1 TO TEST BLOOD SUGAR AS NEEDED. 200 strip 11     ACCU-CHEK GUIDE test strip USE 1 TO TEST BLOOD SUGAR AS NEEDED 300 strip 3     alendronate (FOSAMAX) 70 MG tablet Take 1 tablet (70 mg) by mouth every 7 days Food or drink.  Avoid lying down for 30 minutes after taking the medicine. 12 tablet 4     aspirin (SM ASPIRIN ADULT LOW STRENGTH) 81 MG EC tablet Take 1 tablet (81 mg total) by mouth daily. 90 tablet 3     ASPIRIN LOW DOSE 81 MG EC tablet Take 1 tablet (81 mg) by mouth once daily 90 tablet 3     BD ULTRA-FINE DONTAE PEN NEEDLE 32 gauge x 5/32\" Ndle [BD ULTRA-FINE DONTAE PEN NEEDLE 32 GAUGE X 5/32\" NDLE] USE WITH INSULIN ONCE DAILY 100 each 2     BD ULTRA-FINE DONTAE PEN NEEDLES 32 gauge x 5/32\" Ndle [BD ULTRA-FINE DONTAE PEN NEEDLES 32 GAUGE X 5/32\" NDLE] USE WITH INSULIN ONCE DAILY 100 each 3     blood glucose monitoring (SOFTCLIX) lancets USE AS DIRECTED DAILY 300 each 3     blood glucose test (ACCU-CHEK GRETA PLUS TEST STRP) strips [BLOOD GLUCOSE TEST (ACCU-CHEK GRETA PLUS TEST STRP) STRIPS] USE TO TEST TWICE DAILY AS NEEDED 200 strip 3     blood-glucose meter (ACCU-CHEK GRETA PLUS METER) Misc [BLOOD-GLUCOSE METER (ACCU-CHEK GRETA PLUS METER) MISC] USE TO TEST BLOOD GLUCOSE ONCE A DAY 1 each 0     blood-glucose meter (CONTOUR NEXT METER) Misc [BLOOD-GLUCOSE METER (CONTOUR NEXT METER) MISC] Check bloo dsugar daily 1 each 0     codeine-guaiFENesin 200-10 MG/5ML LIQD 5-10 ml at bedtime as needed for cough 240 mL 1     diclofenac (VOLTAREN) 1 % topical gel Apply 2 g topically 4 times daily 150 g 4     fluticasone (FLONASE) 50 MCG/ACT nasal spray Spray 1 spray into both nostrils daily 16 g 4     generic lancets (ACCU-CHEK SOFTCLIX LANCETS) [GENERIC LANCETS (ACCU-CHEK SOFTCLIX LANCETS)] Use 1 each As Directed daily. as directed 300 each 2     generic " "lancets (MICROLET LANCET) [GENERIC LANCETS (MICROLET LANCET)] USE TO TEST TWO TIMES A  each 3     glipiZIDE (GLUCOTROL XL) 10 MG 24 hr tablet Take 1 tablet (10 mg) by mouth daily 90 tablet 3     GLUCOSAMINE HCL ORAL [GLUCOSAMINE HCL ORAL] Take 1 tablet by mouth daily.       insulin glargine (LANTUS PEN) 100 UNIT/ML pen Inject 28 Units Subcutaneous At Bedtime 15 mL 11     metFORMIN (GLUCOPHAGE XR) 500 MG 24 hr tablet Take 1 tablet (500 mg) by mouth daily with breakfast Strength: 500 mg 90 tablet 3     salmon oil-omega-3 fatty acids 1,000-200 mg cap [SALMON OIL-OMEGA-3 FATTY ACIDS 1,000-200 MG CAP] Take 1 capsule by mouth daily. 90 each 3     simvastatin (ZOCOR) 20 MG tablet Take 1 tablet (20 mg) by mouth At Bedtime 90 tablet 0     trolamine salicylate (ASPERCREME) 10 % cream [TROLAMINE SALICYLATE (ASPERCREME) 10 % CREAM] Apply to affected area BID 85 g 0     ULTICARE MICRO 32G X 4 MM insulin pen needle USE WITH INSULIN ONCE DAILY 200 each 3     meclizine (ANTIVERT) 25 MG tablet Take 1 tablet (25 mg) by mouth 3 times daily as needed for dizziness 90 tablet 1           Review of Systems   No fever.      Objective    /78 (BP Location: Left arm, Patient Position: Sitting, Cuff Size: Adult Regular)   Pulse 85   Temp 98.5  F (36.9  C) (Oral)   Resp 20   Ht 1.517 m (4' 11.74\")   Wt 62.5 kg (137 lb 12 oz)   SpO2 97%   BMI 27.13 kg/m    Body mass index is 27.13 kg/m .  Physical Exam   Heart normal  Lungs normal  Left TM red superior                    "

## 2023-02-09 ENCOUNTER — OFFICE VISIT (OUTPATIENT)
Dept: FAMILY MEDICINE | Facility: CLINIC | Age: 68
End: 2023-02-09
Payer: MEDICARE

## 2023-02-09 VITALS
SYSTOLIC BLOOD PRESSURE: 124 MMHG | BODY MASS INDEX: 26.92 KG/M2 | RESPIRATION RATE: 12 BRPM | HEIGHT: 60 IN | DIASTOLIC BLOOD PRESSURE: 72 MMHG | TEMPERATURE: 97.7 F | HEART RATE: 76 BPM | OXYGEN SATURATION: 99 % | WEIGHT: 137.13 LBS

## 2023-02-09 DIAGNOSIS — R42 DIZZINESS: Primary | ICD-10-CM

## 2023-02-09 DIAGNOSIS — E11.9 TYPE 2 DIABETES MELLITUS TREATED WITH INSULIN (H): ICD-10-CM

## 2023-02-09 DIAGNOSIS — H66.93 BILATERAL OTITIS MEDIA, UNSPECIFIED OTITIS MEDIA TYPE: ICD-10-CM

## 2023-02-09 DIAGNOSIS — Z79.4 TYPE 2 DIABETES MELLITUS TREATED WITH INSULIN (H): ICD-10-CM

## 2023-02-09 DIAGNOSIS — R51.9 NONINTRACTABLE HEADACHE, UNSPECIFIED CHRONICITY PATTERN, UNSPECIFIED HEADACHE TYPE: ICD-10-CM

## 2023-02-09 PROCEDURE — 99214 OFFICE O/P EST MOD 30 MIN: CPT | Performed by: FAMILY MEDICINE

## 2023-02-09 RX ORDER — MECLIZINE HYDROCHLORIDE 25 MG/1
25 TABLET ORAL 3 TIMES DAILY PRN
Qty: 90 TABLET | Refills: 1 | Status: SHIPPED | OUTPATIENT
Start: 2023-02-09 | End: 2023-04-28

## 2023-02-09 NOTE — PROGRESS NOTES
Dizziness  Explained to the patient and daughter that her dizziness is not likely due to blood sugar issues.  Had 1 episode of spinning room sensation.  The dizziness is also improving already.  She will try meclizine 3 times a day as needed.  - meclizine (ANTIVERT) 25 MG tablet; Take 1 tablet (25 mg) by mouth 3 times daily as needed for dizziness    Nonintractable headache, unspecified chronicity pattern, unspecified headache type  No acute severe headaches today.  Tylenol as needed for now    Bilateral otitis media, unspecified otitis media type  Improved.    Type 2 diabetes mellitus treated with insulin (H)  No medication changes today.  We will plan to follow-up in 2 months.    Jahaira Schmitt is a 67 year old accompanied by her daughter, presenting for the following health issues:  She is here to follow-up on dizziness.  She was seen by Dr. Robert on 2/3/2023, was given antibiotic and cough syrup for ear infection.  She completed all medications.  The dizziness started a week ago, 1 day before seeing .  She felt lightheaded and had room spinning sensation that day.  She checks her blood sugar and it was 79, she ate something and the dizziness went away but started having headaches.  No history of vertigo in the past.  Headache is improving, dizziness is also improving but still having mild symptoms.  No more spinning room sensation but complaining of mild dizziness lasting for a few minutes almost every day.  Headache is also improving, no photophobia or phonophobia.  No nausea or vomiting.    History of Present Illness       Diabetes:   She presents for follow up of diabetes.  She is checking home blood glucose one time daily. She checks blood glucose before meals.  Blood glucose is sometimes over 200 and never under 70. She is aware of hypoglycemia symptoms including shakiness, dizziness, weakness and blurred vision. She is concerned about low blood sugar, several less than 70 in the past few  "weeks. She is having numbness in feet.         She eats 2-3 servings of fruits and vegetables daily.She consumes 0 sweetened beverage(s) daily.She exercises with enough effort to increase her heart rate 9 or less minutes per day.  She exercises with enough effort to increase her heart rate 3 or less days per week.   She is taking medications regularly.             Review of Systems   CONSTITUTIONAL: NEGATIVE for fever, chills, change in weight  RESP: NEGATIVE for significant cough or SOB  CV: NEGATIVE for chest pain, palpitations or peripheral edema      Objective    /72 (BP Location: Left arm, Patient Position: Sitting, Cuff Size: Adult Regular)   Pulse 76   Temp 97.7  F (36.5  C) (Oral)   Resp 12   Ht 1.517 m (4' 11.74\")   Wt 62.2 kg (137 lb 2 oz)   SpO2 99%   BMI 27.01 kg/m    Body mass index is 27.01 kg/m .  Physical Exam   GENERAL: healthy, alert and no distress  HENT: ear canals and TM's normal, nose and mouth without ulcers or lesions  NECK: no adenopathy, no asymmetry, masses, or scars and thyroid normal to palpation  RESP: lungs clear to auscultation - no rales, rhonchi or wheezes  CV: regular rate and rhythm, normal S1 S2, no S3 or S4, no murmur, click or rub, no peripheral edema and peripheral pulses strong  ABDOMEN: soft, nontender, no hepatosplenomegaly, no masses and bowel sounds normal  NEURO: Normal strength and tone, mentation intact and speech normal  Diabetic foot exam: normal sensory exam                "

## 2023-03-02 ENCOUNTER — OFFICE VISIT (OUTPATIENT)
Dept: FAMILY MEDICINE | Facility: CLINIC | Age: 68
End: 2023-03-02
Payer: MEDICARE

## 2023-03-02 VITALS
HEART RATE: 80 BPM | WEIGHT: 137.06 LBS | OXYGEN SATURATION: 99 % | BODY MASS INDEX: 26.91 KG/M2 | RESPIRATION RATE: 12 BRPM | SYSTOLIC BLOOD PRESSURE: 140 MMHG | HEIGHT: 60 IN | DIASTOLIC BLOOD PRESSURE: 74 MMHG | TEMPERATURE: 97.9 F

## 2023-03-02 DIAGNOSIS — E78.5 HYPERLIPIDEMIA, UNSPECIFIED HYPERLIPIDEMIA TYPE: ICD-10-CM

## 2023-03-02 DIAGNOSIS — Z79.4 TYPE 2 DIABETES MELLITUS TREATED WITH INSULIN (H): ICD-10-CM

## 2023-03-02 DIAGNOSIS — E11.9 TYPE 2 DIABETES MELLITUS TREATED WITH INSULIN (H): ICD-10-CM

## 2023-03-02 DIAGNOSIS — R03.0 ELEVATED BP WITHOUT DIAGNOSIS OF HYPERTENSION: ICD-10-CM

## 2023-03-02 DIAGNOSIS — R42 DIZZINESS: Primary | ICD-10-CM

## 2023-03-02 DIAGNOSIS — Z76.0 ENCOUNTER FOR MEDICATION REFILL: ICD-10-CM

## 2023-03-02 PROCEDURE — 99213 OFFICE O/P EST LOW 20 MIN: CPT | Performed by: FAMILY MEDICINE

## 2023-03-02 RX ORDER — MECLIZINE HYDROCHLORIDE 25 MG/1
25 TABLET ORAL 3 TIMES DAILY PRN
Qty: 90 TABLET | Refills: 1 | Status: CANCELLED | OUTPATIENT
Start: 2023-03-02

## 2023-03-02 RX ORDER — GUAIFENESIN 200 MG/10ML
10 LIQUID ORAL EVERY 6 HOURS PRN
Qty: 200 ML | Refills: 0 | Status: SHIPPED | OUTPATIENT
Start: 2023-03-02 | End: 2023-03-09

## 2023-03-02 NOTE — PROGRESS NOTES
"Dizziness  Improving.  Continue meclizine.    Type 2 diabetes mellitus treated with insulin (H)  No medication changes.  Follow-up as planned.    Hyperlipidemia, unspecified hyperlipidemia type  Recheck at next visit.    Elevated BP without diagnosis of hypertension  Start medication at next visit if indicated.    Encounter for medication refill  Requesting cough syrup to use as needed.  No acute cough.  - guaiFENesin (ROBITUSSIN) 20 mg/mL liquid; Take 10 mLs by mouth every 6 hours as needed for cough    Subjective   Tram is a 68 year old accompanied by her daughter, presenting for the following health issues:  Follow Up (dizziness)  Was seen about a month ago for dizziness.  Given meclizine, taking once a day at night.  The dizziness is improving.  No nausea or vomiting.    History of Present Illness       Reason for visit:  Follow up for dizziness in the past    She eats 2-3 servings of fruits and vegetables daily.She consumes 0 sweetened beverage(s) daily.She exercises with enough effort to increase her heart rate 10 to 19 minutes per day.  She exercises with enough effort to increase her heart rate 4 days per week.   She is taking medications regularly.       Review of Systems   CONSTITUTIONAL: NEGATIVE for fever, chills, change in weight  ENT/MOUTH: NEGATIVE for ear, mouth and throat problems  CV: NEGATIVE for chest pain, palpitations or peripheral edema      Objective    BP (!) 142/76 (BP Location: Left arm, Patient Position: Sitting, Cuff Size: Adult Regular)   Pulse 80   Temp 97.9  F (36.6  C) (Oral)   Resp 12   Ht 1.517 m (4' 11.74\")   Wt 62.2 kg (137 lb 1 oz)   SpO2 99%   BMI 27.00 kg/m    Body mass index is 27 kg/m .  Physical Exam   GENERAL: healthy, alert and no distress  NECK: Supple.  RESP: lungs clear to auscultation - no rales, rhonchi or wheezes  CV: regular rate and rhythm, normal S1 S2, no S3 or S4, no murmur, click or rub, no peripheral edema and peripheral pulses strong  ABDOMEN: soft, " nontender, no hepatosplenomegaly, no masses and bowel sounds normal  MS: no gross musculoskeletal defects noted, no edema

## 2023-03-06 ENCOUNTER — TELEPHONE (OUTPATIENT)
Dept: FAMILY MEDICINE | Facility: CLINIC | Age: 68
End: 2023-03-06
Payer: MEDICARE

## 2023-03-06 DIAGNOSIS — Z76.0 ENCOUNTER FOR MEDICATION REFILL: ICD-10-CM

## 2023-03-06 NOTE — TELEPHONE ENCOUNTER
Pharmacy Returning Call    Reason for call:  Dr. PRESTON sent script for guaifenesin on 3/2/23 and for some reason did not go through electroncally.  Cub needs a new script and PCP is gone all week.     Information relayed to pharmacy   Will have covering provider resend    Okay to leave a detailed message?: No pharmacy is calling. Thanks    Call taken on 3/6/23 at 319 pm by Rhonda Jones CMA TC

## 2023-03-09 RX ORDER — GUAIFENESIN 200 MG/10ML
10 LIQUID ORAL EVERY 6 HOURS PRN
Qty: 200 ML | Refills: 0 | Status: SHIPPED | OUTPATIENT
Start: 2023-03-09 | End: 2024-01-30

## 2023-03-09 NOTE — TELEPHONE ENCOUNTER
Can someone please resend the script below - the pharmacy did not receive the 3/2/23 script.  thanks

## 2023-03-27 ENCOUNTER — TELEPHONE (OUTPATIENT)
Dept: FAMILY MEDICINE | Facility: CLINIC | Age: 68
End: 2023-03-27
Payer: MEDICARE

## 2023-03-27 NOTE — TELEPHONE ENCOUNTER
Patient daughter call on behalf of mother states that mother dizziness got worse and medication is not working please advise.       SKIP SharifA

## 2023-03-28 DIAGNOSIS — R42 DIZZINESS: Primary | ICD-10-CM

## 2023-03-28 NOTE — TELEPHONE ENCOUNTER
Called pt and relayed Dr. Eddy's message to her daughter (C2C on file). Daughter stated that they were able to schedule appt with neurology and appt withy Dr. Eddy scheduled on 4/4/23.    Curtis Calero, BSN RN  [facility]        I placed Neurology referral. If unable to wait, she should go to urgent care or ED if no appointment available here.     Dr. Charles Eddy   3/28/2023 7:54 AM

## 2023-04-04 ENCOUNTER — OFFICE VISIT (OUTPATIENT)
Dept: FAMILY MEDICINE | Facility: CLINIC | Age: 68
End: 2023-04-04
Payer: MEDICARE

## 2023-04-04 VITALS
OXYGEN SATURATION: 100 % | HEIGHT: 59 IN | WEIGHT: 134.25 LBS | DIASTOLIC BLOOD PRESSURE: 78 MMHG | BODY MASS INDEX: 27.06 KG/M2 | TEMPERATURE: 97.7 F | SYSTOLIC BLOOD PRESSURE: 122 MMHG | RESPIRATION RATE: 16 BRPM | HEART RATE: 84 BPM

## 2023-04-04 DIAGNOSIS — Z79.4 TYPE 2 DIABETES MELLITUS TREATED WITH INSULIN (H): ICD-10-CM

## 2023-04-04 DIAGNOSIS — Z23 IMMUNIZATION DUE: ICD-10-CM

## 2023-04-04 DIAGNOSIS — R42 DIZZINESS: Primary | ICD-10-CM

## 2023-04-04 DIAGNOSIS — E11.9 TYPE 2 DIABETES MELLITUS TREATED WITH INSULIN (H): ICD-10-CM

## 2023-04-04 PROCEDURE — 90746 HEPB VACCINE 3 DOSE ADULT IM: CPT | Performed by: FAMILY MEDICINE

## 2023-04-04 PROCEDURE — 99213 OFFICE O/P EST LOW 20 MIN: CPT | Mod: 25 | Performed by: FAMILY MEDICINE

## 2023-04-04 PROCEDURE — G0010 ADMIN HEPATITIS B VACCINE: HCPCS | Performed by: FAMILY MEDICINE

## 2023-04-04 RX ORDER — DIAZEPAM 5 MG
5 TABLET ORAL
Qty: 15 TABLET | Refills: 0 | Status: SHIPPED | OUTPATIENT
Start: 2023-04-04 | End: 2024-01-30

## 2023-04-04 NOTE — PROGRESS NOTES
"  Dizziness  Added Valium to take it at night as needed. No refill.  Discussed ENT referral but patient declined, she will let me know if symptom worsen.   - diazepam (VALIUM) 5 MG tablet; Take 1 tablet (5 mg) by mouth nightly as needed for anxiety (vertigo)    Immunization due  - HEPATITIS B VACCINE, ADULT, IM    Type 2 diabetes mellitus treated with insulin (H)  Labs at next visit.     Jahaira Schmitt is a 68 year old, presenting for the following health issues:  Follow Up (Dizziness)  Improving but still c/o \" spinning room sensation \" at night when she tries to lay down.  No nausea, no vomiting. No weakness. No acute vision changes.   BG are in the low 100s most days.  Referred to Neuro, appt scheduled.         4/4/2023     9:24 AM   Additional Questions   Roomed by Luann Reynoso   Accompanied by Daughter Velma                 Review of Systems   CONSTITUTIONAL: NEGATIVE for fever, chills, change in weight  RESP: NEGATIVE for significant cough or SOB  CV: NEGATIVE for chest pain, palpitations or peripheral edema      Objective    /78   Pulse 84   Temp 97.7  F (36.5  C) (Oral)   Resp 16   Ht 1.499 m (4' 11\")   Wt 60.9 kg (134 lb 4 oz)   SpO2 100%   BMI 27.12 kg/m    Body mass index is 27.12 kg/m .  Physical Exam   GENERAL: healthy, alert and no distress  NECK: Supple.  RESP: lungs clear to auscultation - no rales, rhonchi or wheezes  CV: regular rate and rhythm, normal S1 S2, no S3 or S4, no murmur, click or rub, no peripheral edema and peripheral pulses strong  NEURO: Normal strength and tone, mentation intact and speech normal                Answers for HPI/ROS submitted by the patient on 4/4/2023  What is the reason for your visit today? : dizziness  How many servings of fruits and vegetables do you eat daily?: 2-3  On average, how many sweetened beverages do you drink each day (Examples: soda, juice, sweet tea, etc.  Do NOT count diet or artificially sweetened beverages)?: 0  How many minutes a day do " you exercise enough to make your heart beat faster?: 10 to 19  How many days a week do you exercise enough to make your heart beat faster?: 3 or less  How many days per week do you miss taking your medication?: 0

## 2023-04-05 ENCOUNTER — TELEPHONE (OUTPATIENT)
Dept: FAMILY MEDICINE | Facility: CLINIC | Age: 68
End: 2023-04-05
Payer: MEDICARE

## 2023-04-05 NOTE — TELEPHONE ENCOUNTER
PRIOR AUTHORIZATION DENIED    Medication: DIAZEPAM 5 MG TABLET- DENIED    Denial Date: 4/5/2023    Denial Rational: Patient's diagnosis is not FDA approved.      Appeal Information: If provider would like to appeal please provide a letter of medical necessity.

## 2023-04-05 NOTE — TELEPHONE ENCOUNTER
Central Prior Authorization Team  Phone: [phone]    PA Initiation    Medication: DIAZEPAM 5 MG TABLETS  Insurance Company: [employer] - Phone [phone] Fax [phone]  Pharmacy Filling the Rx: [facility] [id number] [[address]  Filling Pharmacy Phone: [phone]  Filling Pharmacy Fax:    Start Date: 4/5/2023

## 2023-04-05 NOTE — TELEPHONE ENCOUNTER
Prior Authorization Request  Who's requesting:  PHARMACY   Pharmacy Name and Location: [facility] [address] [place] [address] [[address]  Medication Name:   diazepam (VALIUM) 5 MG tablet 15 tablet 0 4/4/2023  --   Sig - Route: Take 1 tablet (5 mg) by mouth nightly as needed for anxiety (vertigo)   Insurance Plan: [employer] MA  Insurance Member ID Number:  [id number]  CoverMyMeds Key: [id number]  Informed patient that prior authorizations can take up to 10 business days for response:   Yes  Okay to leave a detailed message: No

## 2023-04-12 NOTE — PROGRESS NOTES
ASSESMENT AND PLAN:  Diagnoses and all orders for this visit:    Type 2 diabetes mellitus treated with insulin (H)  -     Glycosylated Hemoglobin A1C  -     Microalbumin, Random Urine  -     Hepatitis B Surface Antibody (Anti-HBs) Vaccine Check  -     Comprehensive Metabolic Panel  -     glipiZIDE (GLUCOTROL XL) 10 MG 24 hr tablet; Take 1 tablet (10 mg total) by mouth daily.  Dispense: 90 tablet; Refill: 3  -     insulin glargine (LANTUS SOLOSTAR PEN) 100 unit/mL (3 mL) pen; Inject 26 Units under the skin at bedtime.  Dispense: 15 mL; Refill: 11  -     metFORMIN (GLUCOPHAGE-XR) 500 MG 24 hr tablet; Take 1 tablet (500 mg total) by mouth daily with breakfast.  Dispense: 90 tablet; Refill: 1  Increased Lantus to 26 units daily.  She will continue glipizide and metformin at current dose.    Hyperlipidemia, unspecified hyperlipidemia type  -     Lipid Profile  -     simvastatin (ZOCOR) 20 MG tablet; Take 1 tablet (20 mg total) by mouth at bedtime.  Dispense: 90 tablet; Refill: 1  Tolerating medication well.  Med change if needed after lipid results.    Left knee pain, unspecified chronicity  Improving.  She will take Tylenol as needed.    Need for influenza vaccination  -     Influenza, Recombinant, Inj, Quadrivalent, PF, 18+YRS      This transcription uses voice recognition software, which may contain typographical errors.      SUBJECTIVE: Keshia Rivero is a 65-year-old female with history of type 2 diabetes and hyperlipidemia here for follow-up.  She is here with her daughter.  Her blood sugar ranges from 90- 130 days.  No low blood sugar with hypoglycemic symptoms.  She is currently taking glipizide XL 10 mg daily, metformin  mg daily and Lantus 24 units daily.  States she is feeling well.  No new concerns or complaints.  Knee pain is improving.  She is still working full-time.  No known exposure to COVID-19.  No acute fever, cough, sore throat, chest pain or shortness of breath.    Past Medical History:  Progress note      Name: Christie Patel ADMIT: (Not on file)   : 1974  PCP: Rhoda Villa    MRN: [id number] LOS: 0 days   AGE/SEX: 48 y.o. female  ROOM: Room/bed info not found     Chief Complaint   Patient presents with   • Abnormal ECG       Subjective       History of present illness  Christie Patel is a 48-year-old female patient who has Hashimoto's thyroiditis and has fluctuating thyroid levels, hypertriglyceridemia, obstructive sleep apnea, working on getting CPAP, she is here today for follow-up on her stress test and echocardiogram.  These tests were ordered as part of cardiac evaluation prior to starting diet pills.    Past Medical History:   Diagnosis Date   • Abnormal ECG 2 weeks ago   • Asthma    • Cancer     Cervical   • Diabetes mellitus     Pediatric   • Diverticulitis    • Hormone disorder    • Hyperlipidemia    • Hypothyroid    • Sleep apnea      No past surgical history on file.  Family History   Problem Relation Age of Onset   • Thyroid disease Father    • Hypertension Father    • Diabetes Father    • Heart disease Father    • Stroke Father    • Hyperlipidemia Father    • Cancer Sister         uteral   • Cancer Brother    • Heart disease Brother    • Hyperlipidemia Brother    • Liver disease Brother    • Heart disease Maternal Grandfather    • Hyperlipidemia Maternal Grandfather      Social History     Tobacco Use   • Smoking status: Never     Passive exposure: Never   • Smokeless tobacco: Never   Vaping Use   • Vaping Use: Never used   Substance Use Topics   • Alcohol use: Never     Comment: rare   • Drug use: Never     (Not in a hospital admission)    Allergies:  Contrast dye (echo or unknown ct/mr), Latex, Tramadol, Tuberculin purified protein derivative, Escitalopram, and Morphine      Physical Exam  VITALS REVIEWED    General:      well developed, in no acute distress.    Head:      normocephalic and atraumatic.    Eyes:      PERRL/EOM intact, conjunctiva and sclera  "  Diagnosis Date     Diabetes mellitus (H)      Fibrocystic breast      Hyperlipidemia      Patient Active Problem List   Diagnosis     Vitamin D Deficiency     Heartburn With Regurgitation     Low back pain     Hyperlipidemia, unspecified hyperlipidemia type     Back pain     Varicose vein of leg     Type 2 diabetes mellitus treated with insulin (H)     Left knee pain, unspecified chronicity       Allergies:    Allergies   Allergen Reactions     Glucophage [Metformin] Nausea And Vomiting       Social History     Tobacco Use   Smoking Status Never Smoker   Smokeless Tobacco Never Used       Review of systems otherwise negative except as listed in HPI.   Social History     Tobacco Use   Smoking Status Never Smoker   Smokeless Tobacco Never Used       OBJECTICE: /80 (Patient Site: Left Arm, Patient Position: Sitting, Cuff Size: Adult Regular)   Pulse 80   Temp 98.1  F (36.7  C) (Oral)   Resp 16   Ht 4' 11.84\" (1.52 m)   Wt 137 lb 8 oz (62.4 kg)   SpO2 96%   BMI 27.00 kg/m      DATA REVIEWED:    Labs Reviewed or Ordered (1):       GEN-alert,  in no apparent distress.  HEENT-mucous membranes are moist, neck is supple.  CV-regular rate and rhythm with no murmur.   RESP-lungs clear to auscultation .  ABDOMEN- Soft , not tender.  EXTREM- KNEES-no erythema, swelling or tenderness.  SKIN-normal        [place] [facility]   10/13/2020   " clear with out nystagmus.    Neck:      no masses, thyromegaly,  trachea central with normal respiratory effort and PMI displaced laterally  Lungs:      Clear to auscultation bilaterally  Heart:       Regular rate and rhythm  Msk:      no deformity or scoliosis noted of thoracic or lumbar spine.    Pulses:      pulses normal in all 4 extremities.    Extremities:       No lower extremity edema  Neurologic:      no focal deficits.   alert oriented x3  Skin:      intact without lesions or rashes.    Psych:      alert and cooperative; normal mood and affect; normal attention span and concentration.      Result Review :               Pertinent cardiac workup    1. EKG 4/12/2023 ejection fraction 60 to 65%, no valvular pathology.  2. Exercise treadmill stress test 4/12/2023, no ischemia.  Suboptimal exercise level.      Procedures        Assessment and Plan      Christie Patel is a 48-year-old female patient with no prior cardiac history, she does have thyroid issues, obesity, sleep apnea.  Echocardiogram showed structurally normal heart, her treadmill stress test was okay, she could not walk but 4 minutes, probably due to deconditioning.  In any case, I think that the patient is cleared to start phentermine for weight loss.    Diagnoses and all orders for this visit:    1. Body mass index (BMI) of 34.0-34.9 in adult (Primary)           No follow-ups on file.  Patient was given instructions and counseling regarding her condition or for health maintenance advice. Please see specific information pulled into the AVS if appropriate.

## 2023-04-18 DIAGNOSIS — Z76.0 ENCOUNTER FOR MEDICATION REFILL: ICD-10-CM

## 2023-04-18 RX ORDER — GUAIFENESIN 200 MG/10ML
SOLUTION ORAL
Qty: 118 ML | Refills: 0 | OUTPATIENT
Start: 2023-04-18

## 2023-04-18 NOTE — TELEPHONE ENCOUNTER
Patient did not mention cough when she was in the office recently.  This is not a long-term medication.

## 2023-04-18 NOTE — TELEPHONE ENCOUNTER
Called pt and spoke to daughter (C2C on file). Relayed Dr. Eddy's message and she verba;lized understanding.    Curtis Calero, BSN RN  [facility]

## 2023-04-27 DIAGNOSIS — R42 DIZZINESS: ICD-10-CM

## 2023-04-28 RX ORDER — MECLIZINE HYDROCHLORIDE 25 MG/1
25 TABLET ORAL 3 TIMES DAILY PRN
Qty: 90 TABLET | Refills: 1 | Status: SHIPPED | OUTPATIENT
Start: 2023-04-28 | End: 2023-07-09

## 2023-04-28 NOTE — TELEPHONE ENCOUNTER
"Last Written Prescription Date:  2/9/2023  Last Fill Quantity: 90,  # refills: 1   Last office visit provider:  4/4/2023     Requested Prescriptions   Pending Prescriptions Disp Refills     meclizine (ANTIVERT) 25 MG tablet [Pharmacy Med Name: Meclizine HCl Oral Tablet 25 MG] 90 tablet 0     Sig: Take 1 tablet (25 mg) by mouth 3 times daily as needed for dizziness        Antivertigo/Antiemetic Agents Passed - 4/27/2023  5:09 PM        Passed - Recent (12 mo) or future (30 days) visit within the authorizing provider's specialty     Patient has had an office visit with the authorizing provider or a provider within the authorizing providers department within the previous 12 mos or has a future within next 30 days. See \"Patient Info\" tab in inbasket, or \"Choose Columns\" in Meds & Orders section of the refill encounter.              Passed - Medication is active on med list        Passed - Patient is 18 years of age or older             Medina Reyes RN 04/28/23 12:43 PM  "

## 2023-07-09 DIAGNOSIS — R42 DIZZINESS: ICD-10-CM

## 2023-07-09 RX ORDER — MECLIZINE HYDROCHLORIDE 25 MG/1
25 TABLET ORAL 3 TIMES DAILY PRN
Qty: 90 TABLET | Refills: 11 | Status: SHIPPED | OUTPATIENT
Start: 2023-07-09 | End: 2024-01-30

## 2023-07-10 NOTE — TELEPHONE ENCOUNTER
[facility] Medicine Services  PROGRESS NOTE    Patient Name: Yin Law  : 1937  MRN: [id number]    Date of Admission: 2021  Primary Care Physician: Santiago Chaudhry MD    Subjective   Subjective     CC:  SOA    HPI:  States that she is feeling a little better.    This afternoon nurse called because if afib with RVR rate 140-150, patient feeling anxious.  Takes xanax prn qhs at home.     ROS:  Gen- No fevers, chills  CV- No chest pain, +palpitations  Resp- +cough, +dyspnea  GI- No N/V/D, abd pain         Objective   Objective     Vital Signs:   Temp:  [97.7 °F (36.5 °C)-98.8 °F (37.1 °C)] 98.1 °F (36.7 °C)  Heart Rate:  [57-76] 57  Resp:  [18-29] 22  BP: (152-183)/(50-76) 162/65  Flow (L/min):  [4-15] 15     Physical Exam:  Constitutional: No acute distress, awake, alert, sitting up in bed  HENT: NCAT, mucous membranes moist  Respiratory: few scattered wheezes mostly in left base and left apice, respiratory effort normal   Cardiovascular: RRR, no murmurs, rubs, or gallops  Gastrointestinal: Positive bowel sounds, soft, nontender, nondistended  Musculoskeletal: No bilateral ankle edema  Psychiatric: Appropriate affect, cooperative  Neurologic: Oriented x 3, strength symmetric in all extremities, Cranial Nerves grossly intact to confrontation, speech clear  Skin: No rashes      Results Reviewed:  LAB RESULTS:      Lab 21  0612 21  0000 21  2104 21  1820 21  0845 21  1012   WBC 11.49*  --   --  12.79*  --  14.29*   HEMOGLOBIN 8.6*  --   --  8.5* 8.6* 8.5*   HEMATOCRIT 28.8*  --   --  28.2* 28.5* 28.3*   PLATELETS 266  --   --  276  --  287   NEUTROS ABS 10.80*  --   --  9.99*  --  11.80*   IMMATURE GRANS (ABS) 0.10*  --   --  0.07*  --  0.16*   LYMPHS ABS 0.44*  --   --  1.62  --  1.21   MONOS ABS 0.14  --   --  1.02*  --  1.06*   EOS ABS 0.00  --   --  0.04  --  0.04   MCV 81.8  --   --  80.6  --  77.7*   CRP 6.98*  --   --   --   --  "Last Written Prescription Date:  4/28/2023  Last Fill Quantity: 90,  # refills: 1   Last office visit provider:  4/4/2023 with PCP Dr ANDREINA Eddy     Requested Prescriptions   Pending Prescriptions Disp Refills     meclizine (ANTIVERT) 25 MG tablet [Pharmacy Med Name: Meclizine HCl Oral Tablet 25 MG] 90 tablet 0     Sig: Take 1 tablet (25 mg) by mouth 3 times daily as needed for dizziness        Antivertigo/Antiemetic Agents Passed - 7/9/2023  7:55 PM        Passed - Recent (12 mo) or future (30 days) visit within the authorizing provider's specialty     Patient has had an office visit with the authorizing provider or a provider within the authorizing providers department within the previous 12 mos or has a future within next 30 days. See \"Patient Info\" tab in inbasket, or \"Choose Columns\" in Meds & Orders section of the refill encounter.              Passed - Medication is active on med list        Passed - Patient is 18 years of age or older             Jodie Simpson RN 07/09/23 9:41 PM  "   --    PROCALCITONIN  --   --   --  0.17  --   --    LACTATE  --  1.5 2.3*  --   --   --    *  --   --   --   --   --          Lab 08/06/21  0612 08/05/21  1820 07/31/21  1012   SODIUM 140 136 134*   POTASSIUM 4.3 3.7 4.1   CHLORIDE 101 102 100   CO2 27.0 24.0 26.0   ANION GAP 12.0 10.0 8.0   BUN 17 20 25*   CREATININE 0.76 0.68 0.76   GLUCOSE 138* 104* 105*   CALCIUM 8.4* 8.8 8.4*   MAGNESIUM 2.1  --   --          Lab 08/05/21  1820   TOTAL PROTEIN 6.1   ALBUMIN 3.20*   GLOBULIN 2.9   ALT (SGPT) 21   AST (SGOT) 58*   BILIRUBIN 0.7   ALK PHOS 95         Lab 08/06/21  0612 08/06/21  0000 08/05/21  1820   PROBNP 11,947.0*  --  10,115.0*   TROPONIN T  --  0.359* 0.380*             Lab 08/01/21  0845   IRON 16*   IRON SATURATION 4*   TIBC 364   TRANSFERRIN 244   FERRITIN 213.80*   FOLATE >20.00   VITAMIN B 12 1,792*         Brief Urine Lab Results  (Last result in the past 365 days)      Color   Clarity   Blood   Leuk Est   Nitrite   Protein   CREAT   Urine HCG        08/05/21 2348 Yellow Clear Negative Negative Negative Negative               Microbiology Results Abnormal     Procedure Component Value - Date/Time    Respiratory Panel PCR w/COVID-19(SARS-CoV-2) CAS/SHALINI/KRISTY/PAD/COR/MAD/HAJA In-House, NP Swab in UTM/VTM, 3-4 HR TAT - Swab, Nasopharynx [[id number]]  (Normal) Collected: 08/05/21 2100    Lab Status: Final result Specimen: Swab from Nasopharynx Updated: 08/05/21 2315     ADENOVIRUS, PCR Not Detected     Coronavirus 229E Not Detected     Coronavirus HKU1 Not Detected     Coronavirus NL63 Not Detected     Coronavirus OC43 Not Detected     COVID19 Not Detected     Human Metapneumovirus Not Detected     Human Rhinovirus/Enterovirus Not Detected     Influenza A PCR Not Detected     Influenza B PCR Not Detected     Parainfluenza Virus 1 Not Detected     Parainfluenza Virus 2 Not Detected     Parainfluenza Virus 3 Not Detected     Parainfluenza Virus 4 Not Detected     RSV, PCR Not Detected     Bordetella  pertussis pcr Not Detected     Bordetella parapertussis PCR Not Detected     Chlamydophila pneumoniae PCR Not Detected     Mycoplasma pneumo by PCR Not Detected    Narrative:      In the setting of a positive respiratory panel with a viral infection PLUS a negative procalcitonin without other underlying concern for bacterial infection, consider observing off antibiotics or discontinuation of antibiotics and continue supportive care. If the respiratory panel is positive for atypical bacterial infection (Bordetella pertussis, Chlamydophila pneumoniae, or Mycoplasma pneumoniae), consider antibiotic de-escalation to target atypical bacterial infection.          CT Chest Pulmonary Embolism    Result Date: 8/5/2021  CT CHEST PULMONARY EMBOLISM W CONTRAST INDICATION: Shortness of air, history of pneumonia TECHNIQUE: CT angiogram of the chest with IV contrast. 3-D reconstructions were obtained and reviewed.   Radiation dose reduction techniques included automated exposure control or exposure modulation based on body size. Count of known CT and cardiac nuc med studies performed in previous 12 months: 0. COMPARISON: CTA of the chest from 7/19/2021 FINDINGS: There is no evidence of pulmonary embolism. The heart is again noted to be enlarged and there is coronary artery calcification. The patient's pleural effusions as well as bibasilar atelectasis has increased from prior. Again noted is patchy, predominantly perihilar and upper lobe airspace disease that has progressed from prior. There may be some pulmonary vascular congestion and edema. Upper abdominal structures appear unremarkable. There are mild compression deformities of several thoracolumbar vertebral bodies.     Impression: 1. Negative for pulmonary embolus. 2. There has been interval increase in the patient's bibasilar pleural effusions from prior as well as patchy bilateral airspace disease that may represent progressive pneumonia/infection. There also may be some  potential pulmonary edema. 3. There is cardiomegaly with coronary artery calcification. Signer Name: Jacqueline Ramírez MD  Signed: 8/5/2021 7:56 PM  Workstation Name: [id number]  [facility]      Results for orders placed during the hospital encounter of 07/19/21    Adult Transthoracic Echo Complete W/ Cont if Necessary Per Protocol    Interpretation Summary  · Estimated left ventricular EF = 65%  · Mild aortic valve stenosis is present.  · Aortic valve maximum pressure gradient is 32.9 mmHg. Aortic valve mean pressure gradient is 17.2 mmHg.  · Mild mitral valve regurgitation is present.  · Mild tricuspid valve regurgitation is present.  · Estimated right ventricular systolic pressure from tricuspid regurgitation is 37.0 mmHg.  · There is a large left pleural effusion. There is a moderate sized right pleural effusion.      I have reviewed the medications:  Scheduled Meds:[START ON 8/7/2021] Pharmacy Consult, , Does not apply, Once  apixaban, 5 mg, Oral, Q12H  ascorbic acid, 1,000 mg, Oral, Daily  bisoprolol, 5 mg, Oral, Q24H  ipratropium-albuterol, 3 mL, Nebulization, 4x Daily - RT  methylPREDNISolone sodium succinate, 40 mg, Intravenous, Q8H  pharmacy consult - MTM, , Does not apply, Daily  piperacillin-tazobactam, 3.375 g, Intravenous, Q8H  sertraline, 150 mg, Oral, Daily  sodium chloride, 10 mL, Intravenous, Q12H  vancomycin, 750 mg, Intravenous, Q12H      Continuous Infusions:Pharmacy to dose vancomycin,       PRN Meds:.ALPRAZolam  •  HYDROcodone-acetaminophen  •  ipratropium-albuterol  •  Pharmacy to dose vancomycin  •  sodium chloride  •  sodium chloride    Assessment/Plan   Assessment & Plan     Active Hospital Problems    Diagnosis  POA   • **Sepsis (CMS/HCC) [A41.9]  Yes   • Elevated troponin [R77.8]  Yes   • Acute and chronic respiratory failure with hypoxia (CMS/HCC) [J96.21]  Yes   • Pleural effusion, bilateral [J90]  Yes   • Multifocal pneumonia [J18.9]  Yes   • Atrial fibrillation  (CMS/[facility]) [I48.91]  Yes   • COPD (chronic obstructive pulmonary disease) (CMS/[facility]) [J44.9]  Yes   • Hypertension [I10]  Yes   • Rheumatoid arthritis (CMS/[facility]) [M06.9]  Yes   • Anxiety and depression [F41.9, F32.9]  Yes   • Lactic acidosis [E87.2]  Yes   • Anemia [D64.9]  Yes      Resolved Hospital Problems   No resolved problems to display.        Brief Hospital Course to date:  Yin Law is a 83 y.o. female w/ a hx of chronic respiratory failure on 2 liters ATT, atrial fibrillation (Eliquis), HTN, HLD, COPD, rheumatoid arthritis, depression/anxiety who presented to the ED w/ c/o shortness of breath.      **Acute/chronic, hypoxic respiratory failure   **multifocal Pneumonia   -recent admit for same and treated w/ Doxy, Rocephin and steroids (amiodarone d/c 2/2 concerns for lung toxicity)   -seen by PCP today for repeat/f-u CXR and sent to ED  -meets sepsis criteria based on WBC 12.79, lactic acid 2.3, RR 29, source   -2LNC at home, required up to  NRB overnight but now weaned down to 3LNC   -CTA negative for PE, increase in bibasilar pleural effusions and patchy airspace disease, may represent progressive pna, also possible pulmonary edema   -ABG refused by pt  -follow cultures  -sputum cx scant candida albicans from 7/24  -baseline UA pending; legionella and strep. pneuo- pending   -lactic acid 2.3; reflex wnl  -procal WNL   - IV Vanc and Zosyn day 2  -Solumedrol 40mg TID   -prn and scheduled duo nebs   -IV diuresis   -Pulmonary consult.  D/w Dr. Perez, CT worse than it was 7/19 but not really different than when she went home on 8/2  -ID consult, recs stop vanc.  Cont empiric zosyn and PO doxycycline  -COVID negative  -echo from 7/20 shows EF 65%, mild AS, mild MR, Mild TR with RVSP 37mmHg     **Bilateral pleural effusion   **Acute CHF   -per CTA Chest- interval increase in bibasilar pleural effusions   -ECHO 7/20/2021; EF 65% w/ large left effusion and moderate right effusion; RVSP 37mmHG  -proBNP  10,115.0  -s/p IV Lasix 40mg last pm, 40mg IV again today  -BiPAP   -daily weights; strict I/Os  -consider thoracentesis if respiratory status park not improve.  Discussed with patient and daughter.       **Elevated troponin   -trending down        **PAF, currently afib with RVR  -EKG c/w NSR last pm but now in afib with RVR this afternoon  -continue routine Eliquis, Zebeta  -amiodarone recently d/c 2/2 concern for lung toxicity   -feels anxious so treat anxiety with xanax x 1 (takes prn qhs at home).  Start diltiazem gtt  -has f/u appointment w/ Dr. Montoya in ~3-4 weeks      **Anemia   -previous H/H 8.6/28.5  -monitor      **Anxiety  **Depression   -continue routine Zoloft, PRN Xanax      DVT prophylaxis:  Eliquis          Disposition: I expect the patient to be discharged TBD    CODE STATUS:   Code Status and Medical Interventions:   Ordered at: 08/05/21 3535     Level Of Support Discussed With:    Patient     Code Status:    No CPR     Medical Interventions (Level of Support Prior to Arrest):    Full     Comments:    DNR/ DNI       Darnell Beltran MD  08/06/21

## 2023-08-13 DIAGNOSIS — E78.5 HYPERLIPIDEMIA, UNSPECIFIED HYPERLIPIDEMIA TYPE: ICD-10-CM

## 2023-08-14 NOTE — TELEPHONE ENCOUNTER
"Routing refill request to provider for review/approval because:  A break in medication    Last Written Prescription Date:  12/27/22  Last Fill Quantity: 90,  # refills: 1   Last office visit provider:  4/4/23     Requested Prescriptions   Pending Prescriptions Disp Refills    simvastatin (ZOCOR) 20 MG tablet [Pharmacy Med Name: Simvastatin Oral Tablet 20 MG] 90 tablet 0     Sig: Take 1 tablet (20 mg) by mouth At Bedtime       Statins Protocol Passed - 8/13/2023  7:44 PM        Passed - LDL on file in past 12 months     Recent Labs   Lab Test 12/27/22  0851   LDL 69             Passed - No abnormal creatine kinase in past 12 months     No lab results found.             Passed - Recent (12 mo) or future (30 days) visit within the authorizing provider's specialty     Patient has had an office visit with the authorizing provider or a provider within the authorizing providers department within the previous 12 mos or has a future within next 30 days. See \"Patient Info\" tab in inbasket, or \"Choose Columns\" in Meds & Orders section of the refill encounter.              Passed - Medication is active on med list        Passed - Patient is age 18 or older        Passed - No active pregnancy on record        Passed - No positive pregnancy test in past 12 months             Melina Montana RN 08/13/23 7:44 PM  "

## 2023-08-15 RX ORDER — SIMVASTATIN 20 MG
20 TABLET ORAL AT BEDTIME
Qty: 90 TABLET | Refills: 0 | Status: SHIPPED | OUTPATIENT
Start: 2023-08-15 | End: 2024-01-30

## 2023-09-19 ENCOUNTER — OFFICE VISIT (OUTPATIENT)
Dept: FAMILY MEDICINE | Facility: CLINIC | Age: 68
End: 2023-09-19
Payer: MEDICARE

## 2023-09-19 VITALS
OXYGEN SATURATION: 99 % | DIASTOLIC BLOOD PRESSURE: 79 MMHG | BODY MASS INDEX: 26.52 KG/M2 | WEIGHT: 135.1 LBS | SYSTOLIC BLOOD PRESSURE: 131 MMHG | HEIGHT: 60 IN | TEMPERATURE: 97.7 F | HEART RATE: 71 BPM | RESPIRATION RATE: 20 BRPM

## 2023-09-19 DIAGNOSIS — E11.9 TYPE 2 DIABETES MELLITUS TREATED WITH INSULIN (H): ICD-10-CM

## 2023-09-19 DIAGNOSIS — Z00.00 ANNUAL PHYSICAL EXAM: Primary | ICD-10-CM

## 2023-09-19 DIAGNOSIS — R42 DIZZINESS: ICD-10-CM

## 2023-09-19 DIAGNOSIS — K21.00 GASTROESOPHAGEAL REFLUX DISEASE WITH ESOPHAGITIS WITHOUT HEMORRHAGE: ICD-10-CM

## 2023-09-19 DIAGNOSIS — Z79.4 TYPE 2 DIABETES MELLITUS TREATED WITH INSULIN (H): ICD-10-CM

## 2023-09-19 DIAGNOSIS — E78.5 HYPERLIPIDEMIA, UNSPECIFIED HYPERLIPIDEMIA TYPE: ICD-10-CM

## 2023-09-19 LAB
ALBUMIN SERPL BCG-MCNC: 4.4 G/DL (ref 3.5–5.2)
ALP SERPL-CCNC: 66 U/L (ref 35–104)
ALT SERPL W P-5'-P-CCNC: 48 U/L (ref 0–50)
ANION GAP SERPL CALCULATED.3IONS-SCNC: 12 MMOL/L (ref 7–15)
AST SERPL W P-5'-P-CCNC: 56 U/L (ref 0–45)
BILIRUB SERPL-MCNC: 0.5 MG/DL
BUN SERPL-MCNC: 8.9 MG/DL (ref 8–23)
CALCIUM SERPL-MCNC: 9.3 MG/DL (ref 8.8–10.2)
CHLORIDE SERPL-SCNC: 110 MMOL/L (ref 98–107)
CHOLEST SERPL-MCNC: 214 MG/DL
CREAT SERPL-MCNC: 0.46 MG/DL (ref 0.51–0.95)
CREAT UR-MCNC: 22.3 MG/DL
DEPRECATED HCO3 PLAS-SCNC: 27 MMOL/L (ref 22–29)
EGFRCR SERPLBLD CKD-EPI 2021: >90 ML/MIN/1.73M2
GLUCOSE SERPL-MCNC: 99 MG/DL (ref 70–99)
HBA1C MFR BLD: 8.1 % (ref 0–5.6)
HDLC SERPL-MCNC: 44 MG/DL
LDLC SERPL CALC-MCNC: 105 MG/DL
MICROALBUMIN UR-MCNC: 66.2 MG/L
MICROALBUMIN/CREAT UR: 296.86 MG/G CR (ref 0–25)
NONHDLC SERPL-MCNC: 170 MG/DL
POTASSIUM SERPL-SCNC: 4.5 MMOL/L (ref 3.4–5.3)
PROT SERPL-MCNC: 7.7 G/DL (ref 6.4–8.3)
SODIUM SERPL-SCNC: 149 MMOL/L (ref 136–145)
TRIGL SERPL-MCNC: 324 MG/DL

## 2023-09-19 PROCEDURE — G0008 ADMIN INFLUENZA VIRUS VAC: HCPCS | Performed by: FAMILY MEDICINE

## 2023-09-19 PROCEDURE — 80061 LIPID PANEL: CPT | Performed by: FAMILY MEDICINE

## 2023-09-19 PROCEDURE — 82043 UR ALBUMIN QUANTITATIVE: CPT | Performed by: FAMILY MEDICINE

## 2023-09-19 PROCEDURE — 83036 HEMOGLOBIN GLYCOSYLATED A1C: CPT | Performed by: FAMILY MEDICINE

## 2023-09-19 PROCEDURE — 80053 COMPREHEN METABOLIC PANEL: CPT | Performed by: FAMILY MEDICINE

## 2023-09-19 PROCEDURE — 90662 IIV NO PRSV INCREASED AG IM: CPT | Performed by: FAMILY MEDICINE

## 2023-09-19 PROCEDURE — 82570 ASSAY OF URINE CREATININE: CPT | Performed by: FAMILY MEDICINE

## 2023-09-19 PROCEDURE — 36415 COLL VENOUS BLD VENIPUNCTURE: CPT | Performed by: FAMILY MEDICINE

## 2023-09-19 PROCEDURE — G0438 PPPS, INITIAL VISIT: HCPCS | Performed by: FAMILY MEDICINE

## 2023-09-19 ASSESSMENT — ENCOUNTER SYMPTOMS
HEMATURIA: 0
NAUSEA: 0
DIZZINESS: 0
HEMATOCHEZIA: 0
ABDOMINAL PAIN: 0
SORE THROAT: 0
COUGH: 0
FREQUENCY: 0
MYALGIAS: 0
BREAST MASS: 0
ARTHRALGIAS: 0
FEVER: 0
JOINT SWELLING: 0
SHORTNESS OF BREATH: 0
DIARRHEA: 0
CONSTIPATION: 0
WEAKNESS: 0
NERVOUS/ANXIOUS: 0
HEARTBURN: 0
HEADACHES: 0
EYE PAIN: 0
PALPITATIONS: 0
DYSURIA: 0
PARESTHESIAS: 0
CHILLS: 0

## 2023-09-19 ASSESSMENT — ACTIVITIES OF DAILY LIVING (ADL): CURRENT_FUNCTION: NO ASSISTANCE NEEDED

## 2023-09-19 NOTE — PROGRESS NOTES
"SUBJECTIVE:   Keshia is a 68 year old who presents for Preventive Visit.      9/19/2023     8:27 AM   Additional Questions   Roomed by Amor SIMS   Accompanied by daughter ROSA     Here for physical.  C/O nausea, vomiting after eating , 2-3 times in the past 2 months . Has some epigastric pain. No acut epain today.    Fasting BG 90s-12-s and 130s most days. No > 150 since last visit. Using lantus 26 U 90% of the time and 28 Unit 10 % of the time.      Are you in the first 12 months of your Medicare coverage?  No    Healthy Habits:     In general, how would you rate your overall health?  Fair    Frequency of exercise:  2-3 days/week    Duration of exercise:  15-30 minutes    Do you usually eat at least 4 servings of fruit and vegetables a day, include whole grains    & fiber and avoid regularly eating high fat or \"junk\" foods?  Yes    Taking medications regularly:  Yes    Medication side effects:  None    Ability to successfully perform activities of daily living:  No assistance needed    Home Safety:  No safety concerns identified    Hearing Impairment:  No hearing concerns    In the past 6 months, have you been bothered by leaking of urine?  No    In general, how would you rate your overall mental or emotional health?  Fair    Additional concerns today:  No        Have you ever done Advance Care Planning? (For example, a Health Directive, POLST, or a discussion with a medical provider or your loved ones about your wishes): No, advance care planning information given to patient to review.  Patient plans to discuss their wishes with loved ones or provider.         Fall risk       Cognitive Screening   1) Repeat 3 items (Leader, Season, Table)    2) Clock draw: NORMAL  3) 3 item recall: Recalls 3 objects  Results: 3 items recalled: COGNITIVE IMPAIRMENT LESS LIKELY        Do you have sleep apnea, excessive snoring or daytime drowsiness? : no    Reviewed and updated as needed this visit by clinical staff   Tobacco  Allergies  " Meds              Reviewed and updated as needed this visit by Provider                 Social History     Tobacco Use    Smoking status: Never     Passive exposure: Never    Smokeless tobacco: Never   Substance Use Topics    Alcohol use: No             9/19/2023     8:40 AM   Alcohol Use   Prescreen: >3 drinks/day or >7 drinks/week? Not Applicable     Do you have a current opioid prescription? No  Do you use any other controlled substances or medications that are not prescribed by a provider? None    Current providers sharing in care for this patient include:   Patient Care Team:  Charles Eddy MD as PCP - General (Family Medicine)  Gabriella Stevenson, PharmD as Pharmacist (Pharmacist)  Charles Eddy MD as Assigned PCP  Marcelino Daniels MD as MD (Neurology)    The following health maintenance items are reviewed in Epic and correct as of today:  Health Maintenance   Topic Date Due    ADVANCE CARE PLANNING  Never done    ZOSTER IMMUNIZATION (1 of 2) Never done    COVID-19 Vaccine (4 - Pfizer series) 03/27/2022    MICROALBUMIN  05/17/2023    ANNUAL REVIEW OF HM ORDERS  05/17/2023    MAMMO SCREENING  06/24/2023    A1C  06/27/2023    INFLUENZA VACCINE (1) 09/01/2023    EYE EXAM  11/28/2023    BMP  12/27/2023    LIPID  12/27/2023    FALL RISK ASSESSMENT  12/27/2023    DIABETIC FOOT EXAM  02/09/2024    MEDICARE ANNUAL WELLNESS VISIT  09/19/2024    Pneumococcal Vaccine: 65+ Years (3 - PPSV23 or PCV20) 11/21/2024    DTAP/TDAP/TD IMMUNIZATION (2 - Td or Tdap) 11/12/2025    COLORECTAL CANCER SCREENING  12/14/2025    DEXA  08/29/2037    HEPATITIS C SCREENING  Completed    PHQ-2 (once per calendar year)  Completed    IPV IMMUNIZATION  Aged Out    HPV IMMUNIZATION  Aged Out    MENINGITIS IMMUNIZATION  Aged Out    PAP  Discontinued     Lab work is in process  Last 3 Pap and HPV Results:       Latest Ref Rng & Units 12/24/2020     9:08 AM 11/12/2015     9:00 AM   PAP / HPV   PAP Negative for squamous intraepithelial lesion or  "malignancy. Negative for squamous intraepithelial lesion or malignancy  Electronically signed by Bhumi Corral CT (ASCP) on 1/6/2021 at  1:49 PM    Negative for squamous intraepithelial lesion or malignancy  Electronically signed by Bhumi Corral CT (ASCP) on 11/25/2015 at  2:36 PM      HPV 16 DNA NEG Negative     HPV 18 DNA NEG Negative     Other HR HPV NEG Negative       Any new diagnosis of family breast, ovarian, or bowel cancer? No    FHS-7:        No data to display                Mammogram Screening: Recommended mammography every 1-2 years with patient discussion and risk factor consideration  Pertinent mammograms are reviewed under the imaging tab.    Review of Systems   Constitutional:  Negative for chills and fever.   HENT:  Negative for congestion, ear pain, hearing loss and sore throat.    Eyes:  Negative for pain and visual disturbance.   Respiratory:  Negative for cough and shortness of breath.    Cardiovascular:  Negative for chest pain, palpitations and peripheral edema.   Gastrointestinal:  Negative for abdominal pain, constipation, diarrhea, heartburn, hematochezia and nausea.   Breasts:  Negative for tenderness, breast mass and discharge.   Genitourinary:  Negative for dysuria, frequency, genital sores, hematuria, pelvic pain, urgency, vaginal bleeding and vaginal discharge.   Musculoskeletal:  Negative for arthralgias, joint swelling and myalgias.   Skin:  Negative for rash.   Neurological:  Negative for dizziness, weakness, headaches and paresthesias.   Psychiatric/Behavioral:  Negative for mood changes. The patient is not nervous/anxious.      CONSTITUTIONAL: NEGATIVE for fever, chills, change in weight  RESP: NEGATIVE for significant cough or SOB  CV: NEGATIVE for chest pain/chest pressure    OBJECTIVE:   /79   Pulse 71   Temp 97.7  F (36.5  C) (Oral)   Resp 20   Ht 1.52 m (4' 11.84\")   Wt 61.3 kg (135 lb 1.6 oz)   SpO2 99%   BMI 26.52 kg/m   Estimated body mass " "index is 26.52 kg/m  as calculated from the following:    Height as of this encounter: 1.52 m (4' 11.84\").    Weight as of this encounter: 61.3 kg (135 lb 1.6 oz).    Physical Exam  Gen - alert, orientated, NAD  Eyes - fundascopic exam limited by the undialated pupil but looks symmetric  ENT - oropharynx clear, TMs clear  Neck - supple, no palpable mass or lymphadenopathy  CV - RRR, no murmur  Breast - no dominant mass on either side, no axillary mass.  Resp - lungs CTA  Ab - soft, nontender, no palpable mass or organomegaly   -  Declined. Deferred.   Extrem - warm, no edema  Neuro - CN II-XII intact, strength, sensation, reflexes intact and symmetric  Skin - no rash.  No atypical appearing lesions seen.        ASSESSMENT / PLAN:     Annual physical exam      Type 2 diabetes mellitus treated with insulin (H)  Requests all results to be mailed.   - Albumin Random Urine Quantitative with Creat Ratio  - HEMOGLOBIN A1C  - Lipid panel  - Comprehensive metabolic panel (BMP + Alb, Alk Phos, ALT, AST, Total. Bili, TP)    Gastroesophageal reflux disease with esophagitis without hemorrhage  GI referral if needed.   - omeprazole (PRILOSEC) 20 MG DR capsule; Take 1 capsule (20 mg) by mouth daily    Hyperlipidemia, unspecified hyperlipidemia type  Recheck today.    Dizziness  Advised to keep appointment with Neurology.    COUNSELING:  Reviewed preventive health counseling, as reflected in patient instructions       Regular exercise       Healthy diet/nutrition      BMI:   Estimated body mass index is 26.52 kg/m  as calculated from the following:    Height as of this encounter: 1.52 m (4' 11.84\").    Weight as of this encounter: 61.3 kg (135 lb 1.6 oz).         She reports that she has never smoked. She has never been exposed to tobacco smoke. She has never used smokeless tobacco.      Appropriate preventive services were discussed with this patient, including applicable screening as appropriate for cardiovascular disease, " diabetes, osteopenia/osteoporosis, and glaucoma.  As appropriate for age/gender, discussed screening for colorectal cancer, prostate cancer, breast cancer, and cervical cancer. Checklist reviewing preventive services available has been given to the patient.    Reviewed patients plan of care and provided an AVS. The  for Tram meets the Care Plan requirement. This Care Plan has been established and reviewed with the.          Charles Eddy MD  [facility]    Identified Health Risks:

## 2023-09-22 DIAGNOSIS — E78.5 HYPERLIPIDEMIA, UNSPECIFIED HYPERLIPIDEMIA TYPE: Primary | ICD-10-CM

## 2023-09-22 RX ORDER — ATORVASTATIN CALCIUM 40 MG/1
40 TABLET, FILM COATED ORAL DAILY
Qty: 90 TABLET | Refills: 1 | Status: SHIPPED | OUTPATIENT
Start: 2023-09-22 | End: 2024-07-01

## 2023-09-25 ENCOUNTER — TELEPHONE (OUTPATIENT)
Dept: FAMILY MEDICINE | Facility: CLINIC | Age: 68
End: 2023-09-25
Payer: MEDICARE

## 2023-09-25 NOTE — TELEPHONE ENCOUNTER
Called pt and spoke to her daughter Tierra (C2C on file). Relayed lab result and she verbalized understanding.      Curtis Calero, BSN RN  [facility]        ----- Message from Charles Eddy MD sent at 9/22/2023  2:30 PM CDT -----  Cholesterol numbers are little worse than 8 months ago.  We need to change her cholesterol medication.  She should stop taking simvastatin and start Lipitor.  New prescription sent.  Other test results are unremarkable.  Please call the patient.  Thank you,    Dr. Charles Eddy  9/22/2023 2:30 PM

## 2023-12-04 ENCOUNTER — TRANSFERRED RECORDS (OUTPATIENT)
Dept: HEALTH INFORMATION MANAGEMENT | Facility: CLINIC | Age: 68
End: 2023-12-04
Payer: MEDICARE

## 2023-12-04 LAB — RETINOPATHY: POSITIVE

## 2023-12-08 DIAGNOSIS — Z76.0 ENCOUNTER FOR MEDICATION REFILL: ICD-10-CM

## 2023-12-08 DIAGNOSIS — Z79.4 TYPE 2 DIABETES MELLITUS TREATED WITH INSULIN (H): ICD-10-CM

## 2023-12-08 DIAGNOSIS — E11.9 TYPE 2 DIABETES MELLITUS TREATED WITH INSULIN (H): ICD-10-CM

## 2023-12-11 RX ORDER — PEN NEEDLE, DIABETIC 32GX 5/32"
NEEDLE, DISPOSABLE MISCELLANEOUS
Qty: 200 EACH | Refills: 1 | Status: SHIPPED | OUTPATIENT
Start: 2023-12-11

## 2023-12-11 RX ORDER — METFORMIN HCL 500 MG
TABLET, EXTENDED RELEASE 24 HR ORAL
Qty: 90 TABLET | Refills: 2 | Status: SHIPPED | OUTPATIENT
Start: 2023-12-11 | End: 2024-07-01

## 2023-12-12 DIAGNOSIS — E11.9 TYPE 2 DIABETES MELLITUS TREATED WITH INSULIN (H): ICD-10-CM

## 2023-12-12 DIAGNOSIS — Z79.4 TYPE 2 DIABETES MELLITUS TREATED WITH INSULIN (H): ICD-10-CM

## 2023-12-12 RX ORDER — INSULIN GLARGINE 100 [IU]/ML
28 INJECTION, SOLUTION SUBCUTANEOUS AT BEDTIME
Qty: 15 ML | Refills: 0 | Status: SHIPPED | OUTPATIENT
Start: 2023-12-12 | End: 2024-03-12

## 2024-01-17 ENCOUNTER — TELEPHONE (OUTPATIENT)
Dept: MAMMOGRAPHY | Facility: CLINIC | Age: 69
End: 2024-01-17
Payer: MEDICARE

## 2024-01-17 NOTE — TELEPHONE ENCOUNTER
Patient Quality Outreach    Patient is due for the following:   Breast Cancer Screening - Mammogram    Next Steps:   Schedule a office visit for mammo    Type of outreach:    Phone, spoke to patient/parent. Daughter will have patient schedule when she is in to see the Dr.    Next Steps:  Reach out within 90 days via Phone.    Max number of attempts reached: No. Will try again in 90 days if patient still on fail list.    Questions for provider review:    None           DAMI Baker  Chart routed to Care Team.

## 2024-01-29 DIAGNOSIS — E11.9 TYPE 2 DIABETES MELLITUS TREATED WITH INSULIN (H): ICD-10-CM

## 2024-01-29 DIAGNOSIS — Z79.4 TYPE 2 DIABETES MELLITUS TREATED WITH INSULIN (H): ICD-10-CM

## 2024-01-29 RX ORDER — GLIPIZIDE 10 MG/1
10 TABLET, FILM COATED, EXTENDED RELEASE ORAL DAILY
Qty: 90 TABLET | Refills: 0 | Status: SHIPPED | OUTPATIENT
Start: 2024-01-29 | End: 2024-06-18

## 2024-01-30 ENCOUNTER — OFFICE VISIT (OUTPATIENT)
Dept: FAMILY MEDICINE | Facility: CLINIC | Age: 69
End: 2024-01-30
Payer: MEDICARE

## 2024-01-30 VITALS
BODY MASS INDEX: 26.7 KG/M2 | TEMPERATURE: 97.8 F | DIASTOLIC BLOOD PRESSURE: 78 MMHG | OXYGEN SATURATION: 99 % | HEART RATE: 66 BPM | HEIGHT: 60 IN | WEIGHT: 136 LBS | SYSTOLIC BLOOD PRESSURE: 131 MMHG | RESPIRATION RATE: 20 BRPM

## 2024-01-30 DIAGNOSIS — K21.00 GASTROESOPHAGEAL REFLUX DISEASE WITH ESOPHAGITIS WITHOUT HEMORRHAGE: ICD-10-CM

## 2024-01-30 DIAGNOSIS — R42 DIZZINESS: ICD-10-CM

## 2024-01-30 DIAGNOSIS — M54.50 LEFT-SIDED LOW BACK PAIN WITHOUT SCIATICA, UNSPECIFIED CHRONICITY: ICD-10-CM

## 2024-01-30 DIAGNOSIS — E11.9 TYPE 2 DIABETES MELLITUS TREATED WITH INSULIN (H): Primary | ICD-10-CM

## 2024-01-30 DIAGNOSIS — R10.32 LLQ ABDOMINAL PAIN: ICD-10-CM

## 2024-01-30 DIAGNOSIS — Z79.4 TYPE 2 DIABETES MELLITUS TREATED WITH INSULIN (H): Primary | ICD-10-CM

## 2024-01-30 DIAGNOSIS — E78.5 HYPERLIPIDEMIA, UNSPECIFIED HYPERLIPIDEMIA TYPE: ICD-10-CM

## 2024-01-30 LAB
ANION GAP SERPL CALCULATED.3IONS-SCNC: 7 MMOL/L (ref 7–15)
BUN SERPL-MCNC: 9.4 MG/DL (ref 8–23)
CALCIUM SERPL-MCNC: 9 MG/DL (ref 8.8–10.2)
CHLORIDE SERPL-SCNC: 106 MMOL/L (ref 98–107)
CHOLEST SERPL-MCNC: 130 MG/DL
CREAT SERPL-MCNC: 0.52 MG/DL (ref 0.51–0.95)
DEPRECATED HCO3 PLAS-SCNC: 26 MMOL/L (ref 22–29)
EGFRCR SERPLBLD CKD-EPI 2021: >90 ML/MIN/1.73M2
FASTING STATUS PATIENT QL REPORTED: YES
GLUCOSE SERPL-MCNC: 160 MG/DL (ref 70–99)
HBA1C MFR BLD: 9 % (ref 0–5.6)
HDLC SERPL-MCNC: 44 MG/DL
LDLC SERPL CALC-MCNC: 49 MG/DL
NONHDLC SERPL-MCNC: 86 MG/DL
POTASSIUM SERPL-SCNC: 4.4 MMOL/L (ref 3.4–5.3)
SODIUM SERPL-SCNC: 139 MMOL/L (ref 135–145)
TRIGL SERPL-MCNC: 185 MG/DL

## 2024-01-30 PROCEDURE — 80061 LIPID PANEL: CPT | Performed by: FAMILY MEDICINE

## 2024-01-30 PROCEDURE — 83036 HEMOGLOBIN GLYCOSYLATED A1C: CPT | Performed by: FAMILY MEDICINE

## 2024-01-30 PROCEDURE — 99214 OFFICE O/P EST MOD 30 MIN: CPT | Performed by: FAMILY MEDICINE

## 2024-01-30 PROCEDURE — 36415 COLL VENOUS BLD VENIPUNCTURE: CPT | Performed by: FAMILY MEDICINE

## 2024-01-30 PROCEDURE — 80048 BASIC METABOLIC PNL TOTAL CA: CPT | Performed by: FAMILY MEDICINE

## 2024-01-30 RX ORDER — RESPIRATORY SYNCYTIAL VIRUS VACCINE 120MCG/0.5
0.5 KIT INTRAMUSCULAR ONCE
Qty: 1 EACH | Refills: 0 | Status: CANCELLED | OUTPATIENT
Start: 2024-01-30 | End: 2024-01-30

## 2024-01-30 RX ORDER — CYCLOBENZAPRINE HCL 5 MG
5 TABLET ORAL 2 TIMES DAILY PRN
Qty: 60 TABLET | Refills: 0 | Status: SHIPPED | OUTPATIENT
Start: 2024-01-30 | End: 2024-07-01

## 2024-01-30 RX ORDER — MECLIZINE HYDROCHLORIDE 25 MG/1
25 TABLET ORAL 3 TIMES DAILY PRN
Qty: 90 TABLET | Refills: 11 | Status: SHIPPED | OUTPATIENT
Start: 2024-01-30

## 2024-01-30 RX ORDER — ATORVASTATIN CALCIUM 40 MG/1
40 TABLET, FILM COATED ORAL DAILY
Qty: 90 TABLET | Refills: 1 | Status: CANCELLED | OUTPATIENT
Start: 2024-01-30

## 2024-01-30 RX ORDER — SIMVASTATIN 20 MG
20 TABLET ORAL AT BEDTIME
Qty: 90 TABLET | Refills: 0 | Status: CANCELLED | OUTPATIENT
Start: 2024-01-30

## 2024-01-30 NOTE — COMMUNITY RESOURCES LIST (ENGLISH)
01/30/2024   [facility]  N/A  For questions about this resource list or additional care needs, please contact your primary care clinic or care manager.  Phone: [phone]   Email: N/A   Address: [address] [place] [address]   Hours: N/A        Hotlines and Helplines       Hotline - Housing crisis  1  [facility] Distance: 13.38 miles      Phone/Virtual   [address] [place] [address]  Language: English  Hours: Mon - Sun Open 24 Hours   Phone: (698) 371-5411 Email: communications@[facility].org Website: https://[facility]-[place].org/oursaviourshousing/     2  [facility] Distance: 14.92 miles      Phone/Virtual   [address] [place] [address]  Language: English  Hours: Mon - Sun Open 24 Hours   Phone: (938) 202-7241 Email: info@[facility].[employer] Website: http://www.[facility].org          Housing       Coordinated Entry access point  3  [facility] Distance: 7.39 miles      In-Person, Phone/Virtual   [address] [place] [address]  Language: English  Hours: Mon - Fri 8:30 AM - 4:30 PM  Fees: Free   Phone: (914) 741-7139 Email: info@[facility].org Website: https://www.[facility].org/locations/[facility]/     4  [facility] - Coordinated Access to Housing and Shelter ([facility]) - Coordinated Access - Coordinated Entry access point Distance: 8.86 miles      In-Person, Phone/Virtual   [address] [place] [address]  Language: English  Hours: Mon - Fri 8:00 AM - 4:30 PM  Fees: Free   Phone: (122) 667-2725 Website: https://www.[facility]./residents/assistance-support/assistance/housing-services-support     Drop-in center or day shelter  5  [facility] Distance: 5.92 miles      In-Person   [address] [place] [address]  Language: English  Hours: Mon - Fri 9:00 AM - 4:00 PM  Fees: Free   Phone: (877) 395-5616 Email: anoop@[facility].org Website:  http://[facility].org     6  [facility] Distance: 13.26 miles      In-Person   [address] [place] [address]  Language: English, [place] Islander, Kazakh  Hours: Mon - Sat 7:00 AM - 3:00 PM  Fees: Free, Self Pay   Phone: (913) 639-9360 Email: info@[facility].[employer] Website: https://www.[facility].[employer]/locations/opportunity-center/     Housing search assistance  7  [facility] - Housing Search Assistance Distance: 7.67 miles      Phone/Virtual   [address] [place] [address]  Language: Sami, English, Hmong, Disha, [place]er, Kazakh  Hours: Mon - Fri Appt. Only  Fees: Free   Phone: (122) 439-3789 Website: https://[employer]/     8  [facility] [place] - Mental Health Crisis Housing Search Assistance Distance: 9.86 miles      In-Person, Phone/Virtual   [address] [place] [address]  Language: English  Hours: Mon - Tue 8:00 AM - 4:30 PM , Wed 8:00 AM - 6:00 PM , Thu - Fri 8:00 AM - 4:30 PM  Fees: Free   Phone: (226) 835-8234 Email: [facility]. Website: https://www.[facility]./[facility]/health-medical/clinics-services/mental-health/adult-mental-health     Shelter for families  9  [facility] Distance: 8.83 miles      In-Person   [address] [place] [address]  Language: English  Hours: Mon - Fri 3:00 PM - 9:00 AM , Sat - Sun Open 24 Hours  Fees: Free   Phone: (236) 149-8403 Ext.1 Website: https://www.[facility].org/2020/07/03/emergency-family-shelter/     10  [facility] Distance: 23.33 miles      In-Person   [address] [place] [address]  Language: English  Hours: Mon - Sun Open 24 Hours  Fees: Free, Self Pay   Phone: (788) 951-6190 Email: Carmela@[facility].org Website: http://www.[facility].org/     Shelter for individuals  [address] [place] [address] - Housing Help Distance: 7.18  miles      Phone/Virtual   [address] [place] [address]  Language: English  Hours: Mon - Fri 8:00 AM - 5:00 PM   Phone: (971) 350-9096 Email: m[place].keya@[facility]. Website: https://[facility].org/     12  [facility] [place] [facility] [place] [facility] [place] [facility] [place] [facility] [place] [facility] Distance: 7.4 miles      In-Person   [address] [place] [address]  Language: English  Hours: Mon - Sun 5:00 PM - 10:00 AM  Fees: Free, Self Pay   Phone: (957) 130-2819 Email: info@[employer] Website: https://www.[employer].org/locations/[facility]/          Important Numbers & Websites       Emergency Services   911  City Services   311  Poison Control   (625) 370-8647  Suicide Prevention Lifeline   (641) 475-4041 (TALK)  Child Abuse Hotline   (394) 958-7587 (4-A-Child)  Sexual Assault Hotline   (197) 239-8800 (HOPE)  National Runaway Safeline   (922) 842-9758 (RUNAWAY)  All-Options Talkline   (298) 190-6866  Substance Abuse Referral   (286) 554-7416 (HELP)

## 2024-01-30 NOTE — PROGRESS NOTES
Type 2 diabetes mellitus treated with insulin (H)  A1c 9.0   Increased Lantus to 30 U daily ( pt wants to split 15 U BID )  - Hemoglobin A1c  - Lipid panel  - Basic metabolic panel    Hyperlipidemia, unspecified hyperlipidemia type  Recheck today    Gastroesophageal reflux disease with esophagitis without hemorrhage  Stable    LLQ abdominal pain  - US Abdomen Complete; Future    Left-sided low back pain without sciatica, unspecified chronicity  Discussed SE including dizziness.   - cyclobenzaprine (FLEXERIL) 5 MG tablet; Take 1 tablet (5 mg) by mouth 2 times daily as needed for muscle spasms     Subjective   Tram is a 68 year old, presenting for the following health issues:    DM- Did bot bring BG log but reported fasting BG in 130s-150s. Last A1c was 8.1 in 9/23.    Tolerating lipid med. ( Lipitor )  C/O LLQ pain on and off for about 2 months , radiates to L lower back. No N/V with abdominal pain.     Reflux symptoms are stable.          1/30/2024     8:12 AM   Additional Questions   Roomed by Yana HDZ   Accompanied by daughter     History of Present Illness       Diabetes:   She presents for follow up of diabetes.  She is checking home blood glucose one time daily.   She checks blood glucose before meals.  Blood glucose is never over 200 and never under 70. She is aware of hypoglycemia symptoms including shakiness and dizziness.    She has no concerns regarding her diabetes at this time.   She is not experiencing numbness or burning in feet, excessive thirst, blurry vision, weight changes or redness, sores or blisters on feet.           She eats 2-3 servings of fruits and vegetables daily.She consumes 0 sweetened beverage(s) daily.She exercises with enough effort to increase her heart rate 20 to 29 minutes per day.  She exercises with enough effort to increase her heart rate 4 days per week.   She is taking medications regularly.           Objective    /78   Pulse 66   Temp 97.8  F (36.6  C) (Oral)    "Resp 20   Ht 1.52 m (4' 11.84\")   Wt 61.7 kg (136 lb)   SpO2 99%   BMI 26.70 kg/m    Body mass index is 26.7 kg/m .    Physical Exam   GENERAL: alert and no distress  NECK: Supple  RESP: lungs clear to auscultation - no rales, rhonchi or wheezes  CV: regular rates and rhythm and no murmur, click or rub  ABDOMEN: bowel sounds normal and Mild LLQ tenderness.   MS: no gross musculoskeletal defects noted, no edema  PSYCH: mentation appears normal, affect normal/bright  Diabetic foot exam: no trophic changes or ulcerative lesions and normal sensory exam          Signed Electronically by: Charles Eddy MD    "

## 2024-02-02 ENCOUNTER — TELEPHONE (OUTPATIENT)
Dept: FAMILY MEDICINE | Facility: CLINIC | Age: 69
End: 2024-02-02
Payer: MEDICARE

## 2024-02-02 DIAGNOSIS — R10.32 LLQ ABDOMINAL PAIN: Primary | ICD-10-CM

## 2024-02-02 NOTE — TELEPHONE ENCOUNTER
MD response relayed to Cathryn at [facility]. Imaging staff verbalizes understanding of provider/CSS instructions for follow-up and continued care per provider message.

## 2024-02-02 NOTE — TELEPHONE ENCOUNTER
Author attempted to reach caller at given phone number [phone] but the phone number is non-working.

## 2024-02-02 NOTE — TELEPHONE ENCOUNTER
[facility]  returns call. Does provider just want the Pelvic exam only or abdominal ultrasound as well? Patient is already schedule for abdominal ultrasound on 02/06/24. Please call [facility] back at [phone].    Olga Gaspar,   [facility]  February 2, 2024 1:52 PM       Oumou Adam is a 66 year old female patient.  Patient Active Problem List   Diagnosis   • Coronary artery disease involving native coronary artery of native heart without angina pectoris   • Benign hypertension   • Pure hypercholesterolemia   • Acquired hypothyroidism   • Attention deficit hyperactivity disorder (ADHD), predominantly inattentive type   • Bipolar depression (CMS/[facility])   • Chronic idiopathic gout of multiple sites   • GERD without esophagitis   • Pulmonary emphysema (CMS/[facility])   • Pain syndrome, chronic   • Spinal stenosis of lumbar region   • Fibromyalgia   • On postmenopausal hormone replacement therapy   • Anxiety   • Rosacea   • Encounter for long-term current use of high risk medication   • Chronic Lumbar facet joint pain   • Pain medication agreement broken (cocaine use) 7/2017   • Cellulitis of right lower extremity   • Acute UTI (urinary tract infection)   • Cocaine use   • Chronic ulcer of right leg with fat layer exposed (CMS/[facility])   • Nonhealing ulcer of right lower leg with fat layer exposed (CMS/[facility])   • Diarrhea of presumed infectious origin   • COPD exacerbation (CMS/[facility])   • History of Clostridium difficile colitis   • CHF exacerbation (CMS/[facility])   • Acute on chronic combined systolic and diastolic congestive heart failure (CMS/[facility])   • Substance abuse (CMS/[facility])   • Cellulitis of left lower extremity   • Sepsis (CMS/[facility])   • Tobacco use disorder   • Diabetes mellitus (CMS/[facility])   • Depression   • Shortness of breath   • Cardiomyopathy (CMS/[facility])   • Anemia   • Intertriginous dermatitis   • History of incarceration   • History of cocaine abuse   • PTSD (post-traumatic stress disorder)   • Urinary tract infection without hematuria   • Sleep apnea   • Elevated troponin   • NALLELY (acute kidney injury) (CMS/[facility])   • NSTEMI (non-ST elevated myocardial infarction) (CMS/[facility])   • Ventricular tachycardia (CMS/[facility])   • Ischemic cardiomyopathy   • Postoperative seroma of subcutaneous tissue after  non-dermatologic procedure   • Infected pacemaker, initial encounter (CMS/[facility])     Past Medical History:   Diagnosis Date   • Acquired hypothyroidism 9/12/2016   • Anxiety    • Attention deficit hyperactivity disorder (ADHD), predominantly inattentive type 9/12/2016   • Benign hypertension 9/12/2016   • Bipolar depression (CMS/[facility]) 9/12/2016    Previously had seen psychiatry   • Bronchitis    • Chronic idiopathic gout of multiple sites 9/12/2016    Especially affecting feet.    Sees Dr. Javon Jackson - rheumatology at [facility]   • Chronic pain    • Congestive cardiac failure (CMS/[facility])    • Coronary artery disease involving native coronary artery of native heart without angina pectoris 9/12/2016    Prior cabg 1999, cardiac cath with ptca and stent 2012.     • Depression    • Diabetes mellitus (CMS/[facility])    • Emphysema/COPD (CMS/[facility]) 9/12/2016    Uses 2L oxygen at night as needed only   • Fibromyalgia    • Fracture     skull, collar bone, foot   • GERD without esophagitis 9/12/2016   • WYATT (iron deficiency anemia)    • Myocardial infarction (CMS/[facility])    • On postmenopausal hormone replacement therapy 9/12/2016   • Osteoarthritis of lumbar spine    • Pain syndrome, chronic 9/12/2016    Due to spinal stenosis, fibromyalgia, diffuse OA, chronic gout   • Pneumonia    • Pure hypercholesterolemia 9/12/2016   • RAD (reactive airway disease)    • Rosacea 9/12/2016   • Sinusitis, chronic    • Sleep apnea     does not use cpap   • Spinal stenosis    • Uncomplicated senile dementia    I was asked to see uOmou Adam for chronic pain         Patient is a 66 year old female who was admitted with Complication due to implantable cardioverter-defibrillator (ICD), initial encounter [T82.9XXA].     CC:  Chronic pain      HPI:  Admitted with infected pacer   long history of chronic pain and polysubstance abuse             Pt indiciates she was on high dose opioids (ms contin 30 and oxycodone 10) bt changed to suboxone because of  pulm risk      Admitted  for infected pacer     Current Facility-Administered Medications   Medication Dose Route Frequency Provider Last Rate Last Dose   • sodium chloride 0.9 % flush bag 500 mL  500 mL Intravenous PRN Hung Kulkarni MD       • magnesium sulfate 1 g in dextrose 5% 100 mL IVPB premix  1 g Intravenous Daily PRN Hung Kulkarni MD       • magnesium sulfate 2 g in 50 mL premix IVPB  2 g Intravenous Daily PRN Hung Kulkarni MD   Stopped at 09/19/19 0935   • magnesium sulfate 2 g in 50 mL premix IVPB  2 g Intravenous Q4H PRN Hung Kulkarni MD       • vancomycin 1,250 mg in sodium chloride 0.9% 250 mL IVPB  1,250 mg Intravenous Daily Zaida Milton MD   Stopped at 09/19/19 1900   • potassium CHLORIDE ER tablet 20 mEq  20 mEq Oral Q4H PRN Collin Suárez MD   20 mEq at 09/20/19 0521   • potassium CHLORIDE (KLOR-CON) packet 20 mEq  20 mEq Per NG tube Q4H PRN Collin Suárez MD       • potassium CHLORIDE 20 mEq/100mL IVPB premix  20 mEq Intravenous Q4H PRN Collin Suárez MD       • potassium CHLORIDE ER tablet 40 mEq  40 mEq Oral Q4H PRN Collin Suárez MD       • potassium CHLORIDE (KLOR-CON) packet 40 mEq  40 mEq Per NG tube Q4H PRN Collin Suárez MD       • potassium CHLORIDE 20 mEq/100mL IVPB premix  40 mEq Intravenous Q4H PRN Collin Suárez MD       • sodium chloride 0.9 % flush bag 500 mL  500 mL Intravenous PRN Collin Suárez MD 10 mL/hr at 09/20/19 0000     • nitroGLYcerin 50 mg in dextrose 5% 250 mL infusion  0-200 mcg/min Intravenous Continuous PRN Delia Galindo MD       • traMADol (ULTRAM) tablet 50 mg  50 mg Oral Q6H PRN Rosalba Linton MD       • hydrALAZINE (APRESOLINE) injection 10-20 mg  10-20 mg Intravenous Q6H PRN CARLOS Galdamez       • ALPRAZolam (XANAX) tablet 0.25 mg  0.25 mg Oral Q6H PRN Collin Suárez MD   0.25 mg at 09/20/19 0528   • oxyCODONE (IMM REL) (ROXICODONE) tablet 5 mg  5 mg Oral Q6H PRN Hunter Daniel MD   5 mg at  09/20/19 0525   • sodium chloride (PF) 0.9 % injection 2 mL  2 mL Intracatheter 2 times per day Zaida Milton MD   2 mL at 09/20/19 0847   • sodium chloride 0.9 % flush bag 25 mL  25 mL Intravenous PRN Zaida Milton MD       • dextrose (GLUTOSE) 40 % gel 15 g  15 g Oral PRN Zaida Milton MD       • dextrose 5 % infusion   Intravenous Continuous PRN Zaida Milton MD       • dextrose 50 % injection 25 g  25 g Intravenous PRN Zaida Milton MD       • glucagon (GLUCAGEN) injection 1 mg  1 mg Intramuscular PRN Zaida Milton MD       • insulin lispro (HumaLOG) sliding scale injection   Subcutaneous 4x Daily AC & HS Zaida Milton MD   4 Units at 09/18/19 1849   • acetaminophen (TYLENOL) tablet 650 mg  650 mg Oral Q4H PRN Zaida Milton MD   650 mg at 09/20/19 0900   • albuterol inhaler 2 puff  2 puff Inhalation 4x Daily Resp PRN Zaida Milton MD       • albuterol-ipratropium 2.5 mg/0.5 mg (DUONEB) nebulizer solution 3 mL  3 mL Nebulization Q6H Resp PRN Zaida Milton MD   3 mL at 09/17/19 1816   • allopurinol (ZYLOPRIM) tablet 150 mg  150 mg Oral Daily Zaida Milton MD   150 mg at 09/20/19 0846   • AMIODarone (PACERONE,CORDARONE) tablet 200 mg  200 mg Oral Daily Zaida Milton MD   200 mg at 09/20/19 0844   • atorvastatin (LIPITOR) tablet 40 mg  40 mg Oral Daily Zaida Milton MD   40 mg at 09/20/19 0844   • benzonatate (TESSALON PERLES) capsule 100 mg  100 mg Oral TID PRN Zaida Milton MD       • budesonide-formoterol (SYMBICORT) 160-4.5 MCG/ACT inhaler 2 puff  2 puff Inhalation BID Resp Zaida Milton MD   2 puff at 09/20/19 0733   • docusate sodium-sennosides (SENOKOT S) 50-8.6 MG 1 tablet  1 tablet Oral BID PRN Zaida Milton MD       • DULoxetine (CYMBALTA) capsule 20 mg  20 mg Oral Daily Zaida Milton MD   20 mg at 09/20/19 0844   • ferrous sulfate (65 mg Fe per 325 mg) tablet 325 mg  325 mg Oral BID WC Zaida Milton MD   325 mg at 09/20/19 0844   • fluticasone (FLONASE) 50 MCG/ACT nasal spray 2 spray  2 spray Each Nare Daily Zaida Milton  MD   2 spray at 09/20/19 0845   • furosemide (LASIX) tablet 40 mg  40 mg Oral Daily Zaida Mliton MD   40 mg at 09/20/19 0844   • guaiFENesin (MUCINEX) ER tablet 1,200 mg  1,200 mg Oral BID PRN Zaida Milton MD   1,200 mg at 09/18/19 1843   • hydrOXYzine (ATARAX) tablet 10 mg  10 mg Oral Q6H PRN Zaida Milton MD       • insulin glargine (LANTUS) injection 20 Units  20 Units Subcutaneous Nightly Zaida Milton MD   20 Units at 09/18/19 2249   • levothyroxine (SYNTHROID, LEVOTHROID) tablet 137 mcg  137 mcg Oral QAM AC Zaida Milton MD   137 mcg at 09/20/19 0845   • menthol-methyl salicylate analgesic (ICY HOT, MENTHODERM) ointment 1 application  1 application Topical Q6H PRN Zaida Milton MD   1 application at 09/19/19 0902   • metoPROLOL succinate (TOPROL-XL) ER tablet 50 mg  50 mg Oral Daily Zaida Milton MD   50 mg at 09/20/19 0844   • miconazole (MITRAZOLE) 2 % cream   Topical BID Zaida Milton MD       • nicotine (NICODERM) 21 MG/24HR patch 1 patch  1 patch Transdermal Daily Zaida Milton MD   1 patch at 09/20/19 0846   • oxybutynin (DITROPAN) tablet 5 mg  5 mg Oral BID Zaida Milton MD   5 mg at 09/20/19 0844   • pantoprazole (PROTONIX) EC tablet 40 mg  40 mg Oral QAM AC Zaida Milton MD   40 mg at 09/20/19 0844   • predniSONE (DELTASONE) tablet 10 mg  10 mg Oral Daily with breakfast Zaida Milton MD   10 mg at 09/20/19 0844   • pregabalin (LYRICA) capsule 200 mg  200 mg Oral BID Zaida Milton MD   200 mg at 09/20/19 0843   • prochlorperazine (COMPAZINE) injection 10 mg  10 mg Intravenous Q6H PRN Zaida Milton MD       • sacubitril-valsartan (ENTRESTO) 24-26 MG per tablet 1 tablet  1 tablet Oral BID Zaida Milton MD   1 tablet at 09/20/19 0844   • spironolactone (ALDACTONE) tablet 25 mg  25 mg Oral Daily Zaida Milton MD   25 mg at 09/20/19 0844   • umeclidinium bromide (INCRUSE ELLIPTA) 62.5 MCG/INH inhaler 1 puff  1 puff Inhalation Daily Resp Zaida Milton MD   1 puff at 09/20/19 0733   • VANCOMYCIN - PHARMACIST MONITORED    Does not apply See Admin Instructions Zaida Milton MD       • vitamin - therapeutic multivitamins w/minerals 1 tablet  1 tablet Oral Daily Zaida Milton MD   1 tablet at 09/20/19 0844   • cefepime (MAXIPIME) 1,000 mg in sodium chloride 0.9 % 100 mL IVPB  1,000 mg Intravenous 2 times per day Darien Berger  mL/hr at 09/20/19 0939 1,000 mg at 09/20/19 0939   • lamoTRIgine (LaMICtal) tablet 25 mg  25 mg Oral Daily Darien Berger MD   25 mg at 09/20/19 0844    Followed by   • [START ON 10/2/2019] lamoTRIgine (LaMICtal) tablet 50 mg  50 mg Oral Daily Darien Berger MD       • albuterol-ipratropium 2.5 mg/0.5 mg (DUONEB) nebulizer solution 3 mL  3 mL Nebulization TID Resp Darien Berger MD   3 mL at 09/20/19 0733     ALLERGIES:   Allergen Reactions   • Methocarbamol GI UPSET   • Naproxen GI UPSET   • Tylenol GI UPSET     Principal Problem:    Pulmonary emphysema (CMS/HCC)    Blood pressure 119/56, pulse 67, temperature 98.1 °F (36.7 °C), temperature source Bladder, resp. rate 22, height 5' 3\" (1.6 m), weight 120.1 kg, SpO2 96 %.    Subjective:  Symptoms:  Worsening.  She reports chest pain and chest pressure.  No shortness of breath, cough, weakness, anorexia, diarrhea or anxiety.  (Complain of chest wall and chronic back pain ).    Diet:  Adequate intake.  No nausea or vomiting.    Activity level: Impaired due to pain.    Pain:  She complains of pain that is moderate.  She reports pain is improving.  Pain is requiring pain medication and partially controlled.      Objective:  General Appearance:  Comfortable, well-appearing, in no acute distress and in pain (in bed eating ).    Vital signs: (most recent): Blood pressure 119/56, pulse 67, temperature 98.1 °F (36.7 °C), temperature source Bladder, resp. rate 22, height 5' 3\" (1.6 m), weight 120.1 kg, SpO2 96 %.  Vital signs are normal.    Output: Producing urine and producing stool.    HEENT: Normal HEENT exam.    Lungs:  Normal effort and normal  respiratory rate.    Heart: Normal rate.  Regular rhythm.    Chest: Symmetric chest wall expansion. Chest wall tenderness present.  (Vac dressing )  Abdomen: Abdomen is soft.    Extremities: Normal range of motion.    Neurological: Patient is alert and oriented to person, place and time.  Normal strength.    Skin:  Warm and dry.      Assessment & Plan     Post op removal infected pacer 9/19     Complain of incisional chest wall pain   Complain of chronic back pain    Wants higher doses of pain med     Scores pain at 7/10 with goal of 0  Fentanyl 25 mcg times 2 yesterday and once today   Oxycodone 5 mg times 1 yesterday and once today     Infected paper pocket  Opioid tolerant and dependent  Continuous  Polysubstance abuse   Cad  STEFANY  Chronic pain   Spinal stenosis   Anxiety   Morbid obesity   At risk for side effects from sedating meds  Hx of opioid abuse   ptsd  Hx vtach   Hx of incarceration for manufacture/delivery cocaine      p  D/w pt   Will liberalize her oxycodone   She is at risk for respiratory issues with STEFANY     Explained to pt: with her underlying co-morbidites, may not be able to control pain to her level of satisfaction    No long acting opioids   No more than 90 mme per day (60 mg oxycodone)         Hunter Daniel MD  9/20/2019

## 2024-02-02 NOTE — TELEPHONE ENCOUNTER
[facility] called requesting for clarification. They stated there is an order for abdominal ultrasound for LLQ pain. They are wondering if PCP is wanting to look at the ovaries. If yes, Order needs to be a pelvic exam ultrasound instead of abdominal ultrasound. Orders can be updated in epic.      Call back imaging center at [phone]. Okay to leave detailed message.      Curtis Luke Cem Say, BSN RN  [facility]

## 2024-02-05 ENCOUNTER — TELEPHONE (OUTPATIENT)
Dept: FAMILY MEDICINE | Facility: CLINIC | Age: 69
End: 2024-02-05
Payer: MEDICARE

## 2024-02-05 NOTE — TELEPHONE ENCOUNTER
Charles Eddy MD  [facility] Family Medicine/Ob Support Pool  Please send a copy of all test results to the patient.    Dr. Charles Eddy  2/1/2024 3:39 PM      Results printed and mailed to SARA Sandhu CemN RN  [facility]

## 2024-02-06 ENCOUNTER — HOSPITAL ENCOUNTER (OUTPATIENT)
Dept: ULTRASOUND IMAGING | Facility: HOSPITAL | Age: 69
Discharge: HOME | End: 2024-02-06
Attending: FAMILY MEDICINE
Payer: MEDICARE

## 2024-02-06 DIAGNOSIS — R10.32 LLQ ABDOMINAL PAIN: ICD-10-CM

## 2024-02-06 PROCEDURE — 76700 US EXAM ABDOM COMPLETE: CPT

## 2024-02-06 PROCEDURE — 76856 US EXAM PELVIC COMPLETE: CPT

## 2024-02-06 PROCEDURE — 76830 TRANSVAGINAL US NON-OB: CPT

## 2024-02-09 ENCOUNTER — TELEPHONE (OUTPATIENT)
Dept: FAMILY MEDICINE | Facility: CLINIC | Age: 69
End: 2024-02-09
Payer: MEDICARE

## 2024-02-09 NOTE — TELEPHONE ENCOUNTER
Writer called patient regarding provider's message below. Provider message relayed to daughter, Tierra Mendoza (CTC on file.)    Provider message relayed to daughter. Daughter verbalizes understanding, agrees with plan and has no further questions.    Closing encounter.    SARA NavaN, RN   [facility]

## 2024-02-09 NOTE — TELEPHONE ENCOUNTER
----- Message from Charles Eddy MD sent at 2/6/2024  1:53 PM CST -----  No specific concerning findings on abdominal ultrasound.  Possible small right renal cysts, we will consider follow-up ultrasound in the future.  No immediate action needed at this time.  No acute follow-up needed for this.  Please call the patient.    Dr. Charles Eddy  2/6/2024 1:52 PM

## 2024-02-15 ENCOUNTER — ANCILLARY PROCEDURE (OUTPATIENT)
Dept: MAMMOGRAPHY | Facility: CLINIC | Age: 69
End: 2024-02-15
Payer: MEDICARE

## 2024-02-15 DIAGNOSIS — Z12.31 VISIT FOR SCREENING MAMMOGRAM: ICD-10-CM

## 2024-02-15 PROCEDURE — 77067 SCR MAMMO BI INCL CAD: CPT | Mod: TC | Performed by: RADIOLOGY

## 2024-02-28 ENCOUNTER — MEDICAL CORRESPONDENCE (OUTPATIENT)
Dept: HEALTH INFORMATION MANAGEMENT | Facility: CLINIC | Age: 69
End: 2024-02-28

## 2024-03-12 DIAGNOSIS — E11.9 TYPE 2 DIABETES MELLITUS TREATED WITH INSULIN (H): ICD-10-CM

## 2024-03-12 DIAGNOSIS — Z79.4 TYPE 2 DIABETES MELLITUS TREATED WITH INSULIN (H): ICD-10-CM

## 2024-03-12 RX ORDER — INSULIN GLARGINE 100 [IU]/ML
28 INJECTION, SOLUTION SUBCUTANEOUS AT BEDTIME
Qty: 15 ML | Refills: 0 | Status: SHIPPED | OUTPATIENT
Start: 2024-03-12 | End: 2024-05-13

## 2024-03-30 DIAGNOSIS — Z79.4 TYPE 2 DIABETES MELLITUS TREATED WITH INSULIN (H): ICD-10-CM

## 2024-03-30 DIAGNOSIS — E11.9 TYPE 2 DIABETES MELLITUS TREATED WITH INSULIN (H): ICD-10-CM

## 2024-04-01 RX ORDER — GLIPIZIDE 10 MG/1
10 TABLET, FILM COATED, EXTENDED RELEASE ORAL DAILY
Qty: 90 TABLET | Refills: 0 | OUTPATIENT
Start: 2024-04-01

## 2024-04-01 RX ORDER — ASPIRIN 81 MG/1
TABLET ORAL
Qty: 90 TABLET | Refills: 2 | Status: SHIPPED | OUTPATIENT
Start: 2024-04-01 | End: 2024-07-01

## 2024-04-01 RX ORDER — INSULIN GLARGINE 100 [IU]/ML
INJECTION, SOLUTION SUBCUTANEOUS
Qty: 15 ML | Refills: 0 | OUTPATIENT
Start: 2024-04-01

## 2024-05-10 DIAGNOSIS — E11.9 TYPE 2 DIABETES MELLITUS TREATED WITH INSULIN (H): ICD-10-CM

## 2024-05-10 DIAGNOSIS — Z79.4 TYPE 2 DIABETES MELLITUS TREATED WITH INSULIN (H): ICD-10-CM

## 2024-05-13 RX ORDER — INSULIN GLARGINE 100 [IU]/ML
INJECTION, SOLUTION SUBCUTANEOUS
Qty: 15 ML | Refills: 0 | Status: SHIPPED | OUTPATIENT
Start: 2024-05-13 | End: 2024-07-01

## 2024-05-17 ENCOUNTER — TELEPHONE (OUTPATIENT)
Dept: FAMILY MEDICINE | Facility: CLINIC | Age: 69
End: 2024-05-17
Payer: MEDICARE

## 2024-05-17 NOTE — TELEPHONE ENCOUNTER
Patient Quality Outreach    Patient is due for the following:   Diabetes -  A1C    Next Steps:   Schedule a office visit for Diabetes follow  up    Type of outreach:    Phone, spoke to patient/parent. Patient/parent will call back to schedule.    Next Steps:  Reach out within 90 days via Phone.    Max number of attempts reached: No. Will try again in 90 days if patient still on fail list.    Questions for provider review:    None           Ney Tai  Chart routed to Care Team.

## 2024-06-17 DIAGNOSIS — Z79.4 TYPE 2 DIABETES MELLITUS TREATED WITH INSULIN (H): ICD-10-CM

## 2024-06-17 DIAGNOSIS — E11.9 TYPE 2 DIABETES MELLITUS TREATED WITH INSULIN (H): ICD-10-CM

## 2024-06-18 RX ORDER — GLIPIZIDE 10 MG/1
10 TABLET, FILM COATED, EXTENDED RELEASE ORAL DAILY
Qty: 90 TABLET | Refills: 0 | Status: SHIPPED | OUTPATIENT
Start: 2024-06-18 | End: 2024-07-01

## 2024-07-01 ENCOUNTER — MEDICAL CORRESPONDENCE (OUTPATIENT)
Dept: HEALTH INFORMATION MANAGEMENT | Facility: CLINIC | Age: 69
End: 2024-07-01

## 2024-07-01 ENCOUNTER — OFFICE VISIT (OUTPATIENT)
Dept: FAMILY MEDICINE | Facility: CLINIC | Age: 69
End: 2024-07-01
Payer: MEDICARE

## 2024-07-01 VITALS
HEART RATE: 68 BPM | OXYGEN SATURATION: 99 % | RESPIRATION RATE: 20 BRPM | SYSTOLIC BLOOD PRESSURE: 121 MMHG | BODY MASS INDEX: 27.39 KG/M2 | TEMPERATURE: 97.7 F | DIASTOLIC BLOOD PRESSURE: 74 MMHG | WEIGHT: 139.5 LBS | HEIGHT: 60 IN

## 2024-07-01 DIAGNOSIS — K21.00 GASTROESOPHAGEAL REFLUX DISEASE WITH ESOPHAGITIS WITHOUT HEMORRHAGE: ICD-10-CM

## 2024-07-01 DIAGNOSIS — E11.9 TYPE 2 DIABETES MELLITUS TREATED WITH INSULIN (H): Primary | ICD-10-CM

## 2024-07-01 DIAGNOSIS — E78.5 HYPERLIPIDEMIA, UNSPECIFIED HYPERLIPIDEMIA TYPE: ICD-10-CM

## 2024-07-01 DIAGNOSIS — M81.0 OSTEOPOROSIS, UNSPECIFIED OSTEOPOROSIS TYPE, UNSPECIFIED PATHOLOGICAL FRACTURE PRESENCE: ICD-10-CM

## 2024-07-01 DIAGNOSIS — Z79.4 TYPE 2 DIABETES MELLITUS TREATED WITH INSULIN (H): Primary | ICD-10-CM

## 2024-07-01 LAB
ALBUMIN SERPL BCG-MCNC: 4.1 G/DL (ref 3.5–5.2)
ALP SERPL-CCNC: 77 U/L (ref 40–150)
ALT SERPL W P-5'-P-CCNC: 28 U/L (ref 0–50)
ANION GAP SERPL CALCULATED.3IONS-SCNC: 9 MMOL/L (ref 7–15)
AST SERPL W P-5'-P-CCNC: 34 U/L (ref 0–45)
BILIRUB SERPL-MCNC: 0.6 MG/DL
BUN SERPL-MCNC: 12.8 MG/DL (ref 8–23)
CALCIUM SERPL-MCNC: 9.3 MG/DL (ref 8.8–10.2)
CHLORIDE SERPL-SCNC: 104 MMOL/L (ref 98–107)
CHOLEST SERPL-MCNC: 155 MG/DL
CREAT SERPL-MCNC: 0.5 MG/DL (ref 0.51–0.95)
DEPRECATED HCO3 PLAS-SCNC: 26 MMOL/L (ref 22–29)
EGFRCR SERPLBLD CKD-EPI 2021: >90 ML/MIN/1.73M2
FASTING STATUS PATIENT QL REPORTED: YES
FASTING STATUS PATIENT QL REPORTED: YES
GLUCOSE SERPL-MCNC: 125 MG/DL (ref 70–99)
HBA1C MFR BLD: 8.1 % (ref 0–5.6)
HDLC SERPL-MCNC: 48 MG/DL
LDLC SERPL CALC-MCNC: 62 MG/DL
NONHDLC SERPL-MCNC: 107 MG/DL
POTASSIUM SERPL-SCNC: 4.6 MMOL/L (ref 3.4–5.3)
PROT SERPL-MCNC: 7.1 G/DL (ref 6.4–8.3)
SODIUM SERPL-SCNC: 139 MMOL/L (ref 135–145)
TRIGL SERPL-MCNC: 225 MG/DL

## 2024-07-01 PROCEDURE — 80061 LIPID PANEL: CPT | Performed by: FAMILY MEDICINE

## 2024-07-01 PROCEDURE — 80053 COMPREHEN METABOLIC PANEL: CPT | Performed by: FAMILY MEDICINE

## 2024-07-01 PROCEDURE — 83036 HEMOGLOBIN GLYCOSYLATED A1C: CPT | Performed by: FAMILY MEDICINE

## 2024-07-01 PROCEDURE — G2211 COMPLEX E/M VISIT ADD ON: HCPCS | Performed by: FAMILY MEDICINE

## 2024-07-01 PROCEDURE — 36415 COLL VENOUS BLD VENIPUNCTURE: CPT | Performed by: FAMILY MEDICINE

## 2024-07-01 PROCEDURE — 99214 OFFICE O/P EST MOD 30 MIN: CPT | Performed by: FAMILY MEDICINE

## 2024-07-01 RX ORDER — ATORVASTATIN CALCIUM 40 MG/1
40 TABLET, FILM COATED ORAL DAILY
Qty: 90 TABLET | Refills: 1 | Status: SHIPPED | OUTPATIENT
Start: 2024-07-01

## 2024-07-01 RX ORDER — INSULIN GLARGINE 100 [IU]/ML
28 INJECTION, SOLUTION SUBCUTANEOUS AT BEDTIME
Qty: 15 ML | Refills: 11 | Status: SHIPPED | OUTPATIENT
Start: 2024-07-01

## 2024-07-01 RX ORDER — GLIPIZIDE 10 MG/1
10 TABLET, FILM COATED, EXTENDED RELEASE ORAL DAILY
Qty: 90 TABLET | Refills: 1 | Status: SHIPPED | OUTPATIENT
Start: 2024-07-01

## 2024-07-01 RX ORDER — PEN NEEDLE, DIABETIC 32GX 5/32"
NEEDLE, DISPOSABLE MISCELLANEOUS
Qty: 200 EACH | Refills: 11 | Status: SHIPPED | OUTPATIENT
Start: 2024-07-01

## 2024-07-01 RX ORDER — METFORMIN HCL 500 MG
TABLET, EXTENDED RELEASE 24 HR ORAL
Qty: 180 TABLET | Refills: 2 | Status: SHIPPED | OUTPATIENT
Start: 2024-07-01

## 2024-07-01 RX ORDER — GLUCOSAMINE HCL/CHONDROITIN SU 500-400 MG
CAPSULE ORAL
Qty: 200 STRIP | Refills: 11 | Status: SHIPPED | OUTPATIENT
Start: 2024-07-01

## 2024-07-01 RX ORDER — ALENDRONATE SODIUM 70 MG/1
70 TABLET ORAL
Qty: 12 TABLET | Refills: 4 | Status: SHIPPED | OUTPATIENT
Start: 2024-07-01

## 2024-07-01 RX ORDER — ASPIRIN 81 MG/1
TABLET ORAL
Qty: 90 TABLET | Refills: 2 | Status: SHIPPED | OUTPATIENT
Start: 2024-07-01

## 2024-07-01 RX ORDER — RESPIRATORY SYNCYTIAL VIRUS VACCINE 120MCG/0.5
0.5 KIT INTRAMUSCULAR ONCE
Qty: 1 EACH | Refills: 0 | Status: CANCELLED | OUTPATIENT
Start: 2024-07-01 | End: 2024-07-01

## 2024-07-01 RX ORDER — GLIPIZIDE 10 MG/1
10 TABLET, FILM COATED, EXTENDED RELEASE ORAL DAILY
Qty: 90 TABLET | Refills: 1 | Status: SHIPPED | OUTPATIENT
Start: 2024-07-01 | End: 2024-07-01

## 2024-07-01 NOTE — LETTER
July 5, 2024      Keshia Rivero  [address] [facility] [address]        Dear ,    We are writing to inform you of your test results.      Resulted Orders   Hemoglobin A1c   Result Value Ref Range    Hemoglobin A1C 8.1 (H) 0.0 - 5.6 %      Comment:      Normal <5.7%   Prediabetes 5.7-6.4%    Diabetes 6.5% or higher     Note: Adopted from ADA consensus guidelines.    Narrative    Results consistent with previous, repeat testing unnecessary    Comprehensive metabolic panel (BMP + Alb, Alk Phos, ALT, AST, Total. Bili, TP)   Result Value Ref Range    Sodium 139 135 - 145 mmol/L    Potassium 4.6 3.4 - 5.3 mmol/L    Carbon Dioxide (CO2) 26 22 - 29 mmol/L    Anion Gap 9 7 - 15 mmol/L    Urea Nitrogen 12.8 8.0 - 23.0 mg/dL    Creatinine 0.50 (L) 0.51 - 0.95 mg/dL    GFR Estimate >90 >60 mL/min/1.73m2      Comment:      eGFR calculated using 2021 CKD-EPI equation.    Calcium 9.3 8.8 - 10.2 mg/dL    Chloride 104 98 - 107 mmol/L    Glucose 125 (H) 70 - 99 mg/dL    Alkaline Phosphatase 77 40 - 150 U/L    AST 34 0 - 45 U/L      Comment:      Reference intervals for this test were updated on 6/12/2023 to more accurately reflect our healthy population. There may be differences in the flagging of prior results with similar values performed with this method. Interpretation of those prior results can be made in the context of the updated reference intervals.    ALT 28 0 - 50 U/L      Comment:      Reference intervals for this test were updated on 6/12/2023 to more accurately reflect our healthy population. There may be differences in the flagging of prior results with similar values performed with this method. Interpretation of those prior results can be made in the context of the updated reference intervals.      Protein Total 7.1 6.4 - 8.3 g/dL    Albumin 4.1 3.5 - 5.2 g/dL    Bilirubin Total 0.6 <=1.2 mg/dL    Patient Fasting > 8hrs? Yes    Lipid panel   Result Value Ref Range    Cholesterol 155 <200 mg/dL     Triglycerides 225 (H) <150 mg/dL    Direct Measure HDL 48 (L) >=50 mg/dL    LDL Cholesterol Calculated 62 <=100 mg/dL    Non HDL Cholesterol 107 <130 mg/dL    Patient Fasting > 8hrs? Yes     Narrative    Cholesterol  Desirable:  <200 mg/dL    Triglycerides  Normal:  Less than 150 mg/dL  Borderline High:  150-199 mg/dL  High:  200-499 mg/dL  Very High:  Greater than or equal to 500 mg/dL    Direct Measure HDL  Female:  Greater than or equal to 50 mg/dL   Male:  Greater than or equal to 40 mg/dL    LDL Cholesterol  Desirable:  <100mg/dL  Above Desirable:  100-129 mg/dL   Borderline High:  130-159 mg/dL   High:  160-189 mg/dL   Very High:  >= 190 mg/dL    Non HDL Cholesterol  Desirable:  130 mg/dL  Above Desirable:  130-159 mg/dL  Borderline High:  160-189 mg/dL  High:  190-219 mg/dL  Very High:  Greater than or equal to 220 mg/dL       If you have any questions or concerns, please call the clinic at the number listed above.       Sincerely,      Charles Eddy MD

## 2024-07-01 NOTE — PROGRESS NOTES
Type 2 diabetes mellitus treated with insulin (H)  Increase metformin XR to 500 mg twice daily from once a day.  Continue Lantus 28 units daily.  Follow-up in 6 months.    - Hemoglobin A1c  - Comprehensive metabolic panel (BMP + Alb, Alk Phos, ALT, AST, Total. Bili, TP)  - metFORMIN (GLUCOPHAGE XR) 500 MG 24 hr tablet; Take 1 tablet (500 mg) by mouth twice a day  - Glucose Blood (BLOOD GLUCOSE TEST STRIPS) STRP; Test once a day  - insulin glargine (LANTUS SOLOSTAR) 100 UNIT/ML pen; Inject 28 Units Subcutaneous at bedtime  - BD DONTAE U/F 32G X 4 MM insulin pen needle; Use one pen needles daily or as directed.  - aspirin (ASPIRIN LOW DOSE) 81 MG EC tablet; Take 1 tablet by mouth daily  - glipiZIDE (GLUCOTROL XL) 10 MG 24 hr tablet; Take 1 tablet (10 mg) by mouth daily    Gastroesophageal reflux disease with esophagitis without hemorrhage  No acute symptoms.  Advised to take omeprazole 2 weeks at a time if symptoms recur.  - omeprazole (PRILOSEC) 20 MG DR capsule; Take 1 capsule (20 mg) by mouth daily    Osteoporosis, unspecified osteoporosis type, unspecified pathological fracture presence  - alendronate (FOSAMAX) 70 MG tablet; Take 1 tablet (70 mg) by mouth every 7 days Food or drink.  Avoid lying down for 30 minutes after taking the medicine.    Hyperlipidemia, unspecified hyperlipidemia type  - Lipid panel  - atorvastatin (LIPITOR) 40 MG tablet; Take 1 tablet (40 mg) by mouth daily     Jahaira   Tram is a 69 year old, presenting for the following health issues:  She is sing Lantus 28 units, metformin  mg daily and glipizide XL 10 mg daily.  Lantus was increased to last visit to 30 units but patient is still using only 28 units.  Fasting blood sugar ranges from 130s to 180s.  No low blood sugar with hypoglycemic symptoms.    She is tolerating cholesterol medication well.  She is not taking omeprazole daily but using as needed.  No acute heartburn or reflux symptoms in the recent weeks.  She is requesting  "Voltaren gel refill for knee pain.  No worsening knee pain.  No swelling or redness in the knees.          7/1/2024     9:16 AM   Additional Questions   Roomed by Yana ANDREINA   Accompanied by daughter Tierra     History of Present Illness       Diabetes:   She presents for follow up of diabetes.  She is checking home blood glucose one time daily.   She checks blood glucose before meals.  Blood glucose is never over 200 and never under 70. She is aware of hypoglycemia symptoms including dizziness.    She has no concerns regarding her diabetes at this time.   She is not experiencing numbness or burning in feet, excessive thirst, blurry vision, weight changes or redness, sores or blisters on feet.           She eats 2-3 servings of fruits and vegetables daily.She consumes 0 sweetened beverage(s) daily.She exercises with enough effort to increase her heart rate 20 to 29 minutes per day.  She exercises with enough effort to increase her heart rate 4 days per week.   She is taking medications regularly.        Review of Systems  CONSTITUTIONAL: NEGATIVE for fever, chills, change in weight  RESP: NEGATIVE for significant cough or SOB  CV: NEGATIVE for chest pain/chest pressure      Objective    /74   Pulse 68   Temp 97.7  F (36.5  C) (Oral)   Resp 20   Ht 1.52 m (4' 11.84\")   Wt 63.3 kg (139 lb 8 oz)   SpO2 99%   BMI 27.39 kg/m    Body mass index is 27.39 kg/m .    Physical Exam   GENERAL: alert and no distress  NECK: Supple  RESP: lungs clear to auscultation - no rales, rhonchi or wheezes  CV: regular rates and rhythm, no murmur, click or rub, and no peripheral edema  ABDOMEN: soft, nontender, no hepatosplenomegaly, no masses and bowel sounds normal  MS: no gross musculoskeletal defects noted, no edema  BACK: no CVA tenderness, no paralumbar tenderness  PSYCH: mentation appears normal  Diabetic foot exam: no trophic changes or ulcerative lesions and normal sensory exam    This transcription uses voice recognition " software, which may contain typographical errors.    The longitudinal plan of care for the diagnosis(es)/condition(s) as documented were addressed during this visit. Due to the added complexity in care, I will continue to support Tram in the subsequent management and with ongoing continuity of care.          Signed Electronically by: Charles Eddy MD

## 2024-08-01 ENCOUNTER — MEDICAL CORRESPONDENCE (OUTPATIENT)
Dept: HEALTH INFORMATION MANAGEMENT | Facility: CLINIC | Age: 69
End: 2024-08-01
Payer: MEDICARE

## 2024-08-07 ENCOUNTER — TELEPHONE (OUTPATIENT)
Dept: FAMILY MEDICINE | Facility: CLINIC | Age: 69
End: 2024-08-07
Payer: MEDICARE

## 2024-08-07 NOTE — TELEPHONE ENCOUNTER
Daughter called in and states  [facility] pharmacy will be faxing a form for provider sign for test strip and other medication.

## 2024-08-14 ENCOUNTER — TELEPHONE (OUTPATIENT)
Dept: FAMILY MEDICINE | Facility: CLINIC | Age: 69
End: 2024-08-14
Payer: MEDICARE

## 2024-08-14 NOTE — TELEPHONE ENCOUNTER
CMA collected or printed forms from . Forms pre-filled for provider review, completion, and signature. Forms placed in provider's blue folder at  today. Thanks.

## 2024-08-14 NOTE — TELEPHONE ENCOUNTER
Called pharmacy and spoke with Faisal which per medicare form needs to fill out to be able to dispense diabetes testing supplies. Will place form in blue folder for covering provider.

## 2024-08-15 ENCOUNTER — MEDICAL CORRESPONDENCE (OUTPATIENT)
Dept: HEALTH INFORMATION MANAGEMENT | Facility: CLINIC | Age: 69
End: 2024-08-15
Payer: MEDICARE

## 2024-08-16 NOTE — TELEPHONE ENCOUNTER
Form reviewed, completed, and signed by provider.     Returned via fax to [facility] as requested.     Copied to EHR scanning.

## 2024-08-20 ENCOUNTER — PATIENT OUTREACH (OUTPATIENT)
Dept: CARE COORDINATION | Facility: CLINIC | Age: 69
End: 2024-08-20
Payer: MEDICARE

## 2024-09-03 ENCOUNTER — PATIENT OUTREACH (OUTPATIENT)
Dept: CARE COORDINATION | Facility: CLINIC | Age: 69
End: 2024-09-03
Payer: MEDICARE

## 2024-12-09 ENCOUNTER — TRANSFERRED RECORDS (OUTPATIENT)
Dept: HEALTH INFORMATION MANAGEMENT | Facility: CLINIC | Age: 69
End: 2024-12-09
Payer: MEDICARE

## 2024-12-30 DIAGNOSIS — E11.9 TYPE 2 DIABETES MELLITUS TREATED WITH INSULIN (H): ICD-10-CM

## 2024-12-30 DIAGNOSIS — Z79.4 TYPE 2 DIABETES MELLITUS TREATED WITH INSULIN (H): ICD-10-CM

## 2024-12-31 RX ORDER — ASPIRIN 81 MG/1
TABLET ORAL
Qty: 90 TABLET | Refills: 0 | OUTPATIENT
Start: 2024-12-31

## 2025-01-02 ENCOUNTER — OFFICE VISIT (OUTPATIENT)
Dept: FAMILY MEDICINE | Facility: CLINIC | Age: 70
End: 2025-01-02
Payer: MEDICARE

## 2025-01-02 VITALS
BODY MASS INDEX: 26.5 KG/M2 | TEMPERATURE: 97.8 F | HEART RATE: 78 BPM | RESPIRATION RATE: 12 BRPM | DIASTOLIC BLOOD PRESSURE: 78 MMHG | WEIGHT: 135 LBS | SYSTOLIC BLOOD PRESSURE: 122 MMHG | OXYGEN SATURATION: 99 % | HEIGHT: 60 IN

## 2025-01-02 DIAGNOSIS — K21.00 GASTROESOPHAGEAL REFLUX DISEASE WITH ESOPHAGITIS WITHOUT HEMORRHAGE: ICD-10-CM

## 2025-01-02 DIAGNOSIS — E11.9 TYPE 2 DIABETES MELLITUS TREATED WITH INSULIN (H): Primary | ICD-10-CM

## 2025-01-02 DIAGNOSIS — E78.5 HYPERLIPIDEMIA, UNSPECIFIED HYPERLIPIDEMIA TYPE: ICD-10-CM

## 2025-01-02 DIAGNOSIS — R52 PAIN: ICD-10-CM

## 2025-01-02 DIAGNOSIS — R42 DIZZINESS: ICD-10-CM

## 2025-01-02 DIAGNOSIS — Z59.71 INSURANCE COVERAGE PROBLEMS: ICD-10-CM

## 2025-01-02 DIAGNOSIS — Z79.4 TYPE 2 DIABETES MELLITUS TREATED WITH INSULIN (H): Primary | ICD-10-CM

## 2025-01-02 DIAGNOSIS — M81.0 OSTEOPOROSIS WITHOUT CURRENT PATHOLOGICAL FRACTURE, UNSPECIFIED OSTEOPOROSIS TYPE: ICD-10-CM

## 2025-01-02 LAB
ALBUMIN SERPL BCG-MCNC: 4.3 G/DL (ref 3.5–5.2)
ALP SERPL-CCNC: 76 U/L (ref 40–150)
ALT SERPL W P-5'-P-CCNC: 49 U/L (ref 0–50)
ANION GAP SERPL CALCULATED.3IONS-SCNC: 11 MMOL/L (ref 7–15)
AST SERPL W P-5'-P-CCNC: 58 U/L (ref 0–45)
BILIRUB SERPL-MCNC: 0.6 MG/DL
BUN SERPL-MCNC: 13.5 MG/DL (ref 8–23)
CALCIUM SERPL-MCNC: 9.2 MG/DL (ref 8.8–10.4)
CHLORIDE SERPL-SCNC: 103 MMOL/L (ref 98–107)
CHOLEST SERPL-MCNC: 218 MG/DL
CREAT SERPL-MCNC: 0.49 MG/DL (ref 0.51–0.95)
CREAT UR-MCNC: 30.2 MG/DL
EGFRCR SERPLBLD CKD-EPI 2021: >90 ML/MIN/1.73M2
EST. AVERAGE GLUCOSE BLD GHB EST-MCNC: 177 MG/DL
FASTING STATUS PATIENT QL REPORTED: YES
FASTING STATUS PATIENT QL REPORTED: YES
GLUCOSE SERPL-MCNC: 132 MG/DL (ref 70–99)
HBA1C MFR BLD: 7.8 % (ref 0–5.6)
HCO3 SERPL-SCNC: 25 MMOL/L (ref 22–29)
HDLC SERPL-MCNC: 51 MG/DL
HOLD SPECIMEN: NORMAL
LDLC SERPL CALC-MCNC: 129 MG/DL
MICROALBUMIN UR-MCNC: 143 MG/L
MICROALBUMIN/CREAT UR: 473.51 MG/G CR (ref 0–25)
NONHDLC SERPL-MCNC: 167 MG/DL
POTASSIUM SERPL-SCNC: 4.4 MMOL/L (ref 3.4–5.3)
PROT SERPL-MCNC: 7.7 G/DL (ref 6.4–8.3)
SODIUM SERPL-SCNC: 139 MMOL/L (ref 135–145)
TRIGL SERPL-MCNC: 188 MG/DL

## 2025-01-02 RX ORDER — CYCLOBENZAPRINE HCL 5 MG
5 TABLET ORAL 3 TIMES DAILY PRN
Status: CANCELLED | OUTPATIENT
Start: 2025-01-02

## 2025-01-02 RX ORDER — ASPIRIN 81 MG/1
TABLET ORAL
Qty: 90 TABLET | Refills: 2 | Status: CANCELLED | OUTPATIENT
Start: 2025-01-02

## 2025-01-02 RX ORDER — ATORVASTATIN CALCIUM 40 MG/1
40 TABLET, FILM COATED ORAL DAILY
Qty: 90 TABLET | Refills: 1 | Status: CANCELLED | OUTPATIENT
Start: 2025-01-02

## 2025-01-02 RX ORDER — GLIPIZIDE 10 MG/1
10 TABLET, FILM COATED, EXTENDED RELEASE ORAL DAILY
Qty: 90 TABLET | Refills: 1 | Status: SHIPPED | OUTPATIENT
Start: 2025-01-02

## 2025-01-02 RX ORDER — METFORMIN HYDROCHLORIDE 500 MG/1
TABLET, EXTENDED RELEASE ORAL
Qty: 180 TABLET | Refills: 2 | Status: CANCELLED | OUTPATIENT
Start: 2025-01-02

## 2025-01-02 RX ORDER — METFORMIN HYDROCHLORIDE 500 MG/1
TABLET, EXTENDED RELEASE ORAL
Qty: 180 TABLET | Refills: 2 | Status: SHIPPED | OUTPATIENT
Start: 2025-01-02

## 2025-01-02 RX ORDER — CYCLOBENZAPRINE HCL 5 MG
5 TABLET ORAL 3 TIMES DAILY PRN
Qty: 90 TABLET | Refills: 0 | Status: SHIPPED | OUTPATIENT
Start: 2025-01-02

## 2025-01-02 RX ORDER — ATORVASTATIN CALCIUM 40 MG/1
40 TABLET, FILM COATED ORAL DAILY
Qty: 90 TABLET | Refills: 1 | Status: SHIPPED | OUTPATIENT
Start: 2025-01-02

## 2025-01-02 RX ORDER — ASPIRIN 81 MG/1
TABLET ORAL
Qty: 90 TABLET | Refills: 2 | Status: SHIPPED | OUTPATIENT
Start: 2025-01-02

## 2025-01-02 RX ORDER — MECLIZINE HYDROCHLORIDE 25 MG/1
25 TABLET ORAL 3 TIMES DAILY PRN
Qty: 90 TABLET | Refills: 11 | Status: CANCELLED | OUTPATIENT
Start: 2025-01-02

## 2025-01-02 NOTE — PROGRESS NOTES
Type 2 diabetes mellitus treated with insulin (H)  A1c 7.8 today.  Advised to increase Lantus to 30 units from 28 units but patient declined, wants to stay on current dose.    - Hemoglobin A1c  - Comprehensive metabolic panel (BMP + Alb, Alk Phos, ALT, AST, Total. Bili, TP)  - Lipid panel  - Albumin Random Urine Quantitative with Creat Ratio; Future  - Extra Urine (LAB USE ONLY)  - Albumin Random Urine Quantitative with Creat Ratio  - aspirin (ASPIRIN LOW DOSE) 81 MG EC tablet; Take 1 tablet by mouth daily  - glipiZIDE (GLUCOTROL XL) 10 MG 24 hr tablet; Take 1 tablet (10 mg) by mouth daily.  - metFORMIN (GLUCOPHAGE XR) 500 MG 24 hr tablet; Take 1 tablet (500 mg) by mouth twice a day    Hyperlipidemia, unspecified hyperlipidemia type  - atorvastatin (LIPITOR) 40 MG tablet; Take 1 tablet (40 mg) by mouth daily.    Osteoporosis without current pathological fracture, unspecified osteoporosis type  No acute concerns     Dizziness  Stable    Gastroesophageal reflux disease with esophagitis without hemorrhage  Stable  - omeprazole (PRILOSEC) 20 MG DR capsule; Take 1 capsule (20 mg) by mouth daily.    Pain  - cyclobenzaprine (FLEXERIL) 5 MG tablet; Take 1 tablet (5 mg) by mouth 3 times daily as needed for muscle spasms.    Insurance coverage problems  Daughter will call [facility] for prescription assistant program and let us know if we need to send prescription to [facility].          Jahaira Schmitt is a 69 year old female with history of diabetes and hyperlipidemia here for follow-up.  She is here with her daughter who acted as .  History taking from the patient and daughter.  Her fasting blood sugar ranges from 90s to 140s most days with occasional 180s.  No fasting blood sugar above 180 in the past month.  No low blood sugar with hypoglycemic symptoms.  Using Lantus 28 units daily along with metformin and glipizide.  She is tolerating all medications.  She had nausea with metformin in the  "remote past but tolerating now.  Daughter states she does not have Medicare prescription coverage currently.  She is wondering if we can help.        1/2/2025     9:06 AM   Additional Questions   Roomed by TOBIAS Sharif   Accompanied by Daughter : Tierra         Review of Systems  CONSTITUTIONAL: NEGATIVE for fever, chills, change in weight  ENT/MOUTH: NEGATIVE for ear, mouth and throat problems  RESP: NEGATIVE for significant cough or SOB  CV: NEGATIVE for chest pain/chest pressure      Objective    /78 (BP Location: Left arm, Patient Position: Sitting, Cuff Size: Adult Regular)   Pulse 78   Temp 97.8  F (36.6  C) (Oral)   Resp 12   Ht 1.52 m (4' 11.84\")   Wt 61.2 kg (135 lb)   SpO2 99%   BMI 26.51 kg/m    Body mass index is 26.51 kg/m .  Physical Exam   GENERAL: alert and no distress  NECK: Supple  RESP: lungs clear to auscultation - no rales, rhonchi or wheezes  CV: regular rates and rhythm and no murmur, click or rub  ABDOMEN: soft, nontender  MS: no gross musculoskeletal defects noted, no edema  PSYCH: mentation appears normal  Diabetic foot exam: no trophic changes or ulcerative lesions    This transcription uses voice recognition software, which may contain typographical errors.    The longitudinal plan of care for the diagnosis(es)/condition(s) as documented were addressed during this visit. Due to the added complexity in care, I will continue to support Tram in the subsequent management and with ongoing continuity of care.          Signed Electronically by: Charles Eddy MD    Answers submitted by the patient for this visit:  General Questionnaire (Submitted on 1/2/2025)  Chief Complaint: Chronic problems general questions HPI Form  What is the reason for your visit today? : f/u diabetes  Questionnaire about: Chronic problems general questions HPI Form (Submitted on 1/2/2025)  Chief Complaint: Chronic problems general questions HPI Form    "

## 2025-01-06 ENCOUNTER — TELEPHONE (OUTPATIENT)
Dept: FAMILY MEDICINE | Facility: CLINIC | Age: 70
End: 2025-01-06
Payer: MEDICARE

## 2025-01-06 NOTE — TELEPHONE ENCOUNTER
----- Message from Charles Eddy sent at 1/6/2025  4:47 PM CST -----  Cholesterol is worse than last time.  She needs to watch her diet closely and try to exercise regularly.  Other results are within acceptable range.  Will recheck at next visit.  Please call the patient.  Thank you,    Dr. Charles Eddy  1/6/2025 4:47 PM      Spoke to daughter, Tierra Morris, CTC on file with result above provided.  Family is requesting the test result be sent to address on file for patient record.  Patient address verified to be accurate on file with result printed and in mailbox to go out.     Ronnie Zaman RN  [facility]

## 2025-01-06 NOTE — LETTER
"January 6, 2025      Keshia Rivero  [address] [facility] [address]        Dear ,    We are writing to inform you of your test results.    Test results indicate you may require additional follow up, see comment below.    \"Cholesterol is worse than last time.  She needs to watch her diet closely and try to exercise regularly.  Other results are within acceptable range.  Will recheck at next visit.\"    Resulted Orders   Hemoglobin A1c   Result Value Ref Range    Estimated Average Glucose 177 (H) <117 mg/dL    Hemoglobin A1C 7.8 (H) 0.0 - 5.6 %      Comment:      Normal <5.7%   Prediabetes 5.7-6.4%    Diabetes 6.5% or higher     Note: Adopted from ADA consensus guidelines.   Comprehensive metabolic panel (BMP + Alb, Alk Phos, ALT, AST, Total. Bili, TP)   Result Value Ref Range    Sodium 139 135 - 145 mmol/L    Potassium 4.4 3.4 - 5.3 mmol/L    Carbon Dioxide (CO2) 25 22 - 29 mmol/L    Anion Gap 11 7 - 15 mmol/L    Urea Nitrogen 13.5 8.0 - 23.0 mg/dL    Creatinine 0.49 (L) 0.51 - 0.95 mg/dL    GFR Estimate >90 >60 mL/min/1.73m2      Comment:      eGFR calculated using 2021 CKD-EPI equation.    Calcium 9.2 8.8 - 10.4 mg/dL      Comment:      Reference intervals for this test were updated on 7/16/2024 to reflect our healthy population more accurately. There may be differences in the flagging of prior results with similar values performed with this method. Those prior results can be interpreted in the context of the updated reference intervals.    Chloride 103 98 - 107 mmol/L    Glucose 132 (H) 70 - 99 mg/dL    Alkaline Phosphatase 76 40 - 150 U/L    AST 58 (H) 0 - 45 U/L    ALT 49 0 - 50 U/L    Protein Total 7.7 6.4 - 8.3 g/dL    Albumin 4.3 3.5 - 5.2 g/dL    Bilirubin Total 0.6 <=1.2 mg/dL    Patient Fasting > 8hrs? Yes    Lipid panel   Result Value Ref Range    Cholesterol 218 (H) <200 mg/dL    Triglycerides 188 (H) <150 mg/dL    Direct Measure HDL 51 >=50 mg/dL    LDL Cholesterol Calculated 129 (H) <100 " mg/dL    Non HDL Cholesterol 167 (H) <130 mg/dL    Patient Fasting > 8hrs? Yes     Narrative    Cholesterol  Desirable: < 200 mg/dL  Borderline High: 200 - 239 mg/dL  High: >= 240 mg/dL    Triglycerides  Normal: < 150 mg/dL  Borderline High: 150 - 199 mg/dL  High: 200-499 mg/dL  Very High: >= 500 mg/dL    Direct Measure HDL  Female: >= 50 mg/dL   Male: >= 40 mg/dL    LDL Cholesterol  Desirable: < 100 mg/dL  Above Desirable: 100 - 129 mg/dL   Borderline High: 130 - 159 mg/dL   High:  160 - 189 mg/dL   Very High: >= 190 mg/dL    Non HDL Cholesterol  Desirable: < 130 mg/dL  Above Desirable: 130 - 159 mg/dL  Borderline High: 160 - 189 mg/dL  High: 190 - 219 mg/dL  Very High: >= 220 mg/dL   Extra Urine (LAB USE ONLY)   Result Value Ref Range    Hold Specimen JIC    Albumin Random Urine Quantitative with Creat Ratio   Result Value Ref Range    Creatinine Urine mg/dL 30.2 mg/dL      Comment:      The reference ranges have not been established in urine creatinine. The results should be integrated into the clinical context for interpretation.    Albumin Urine mg/L 143.0 mg/L      Comment:      The reference ranges have not been established in urine albumin. The results should be integrated into the clinical context for interpretation.    Albumin Urine mg/g Cr 473.51 (H) 0.00 - 25.00 mg/g Cr      Comment:      Microalbuminuria is defined as an albumin:creatinine ratio of 17 to 299 for males and 25 to 299 for females. A ratio of albumin:creatinine of 300 or higher is indicative of overt proteinuria.  Due to biologic variability, positive results should be confirmed by a second, first-morning random or 24-hour timed urine specimen. If there is discrepancy, a third specimen is recommended. When 2 out of 3 results are in the microalbuminuria range, this is evidence for incipient nephropathy and warrants increased efforts at glucose control, blood pressure control, and institution of therapy with an angiotensin-converting-enzyme  (ACE) inhibitor (if the patient can tolerate it).         If you have any questions or concerns, please call the clinic at the number listed above.       Sincerely,        Nunu Soto MD/CASEY RN  Electronically signed

## 2025-01-23 ENCOUNTER — OFFICE VISIT (OUTPATIENT)
Dept: URGENT CARE | Facility: URGENT CARE | Age: 70
End: 2025-01-23
Payer: MEDICARE

## 2025-01-23 VITALS
TEMPERATURE: 98 F | DIASTOLIC BLOOD PRESSURE: 78 MMHG | RESPIRATION RATE: 20 BRPM | HEART RATE: 78 BPM | OXYGEN SATURATION: 98 % | SYSTOLIC BLOOD PRESSURE: 140 MMHG

## 2025-01-23 DIAGNOSIS — R10.9 RIGHT SIDED ABDOMINAL PAIN: ICD-10-CM

## 2025-01-23 DIAGNOSIS — K59.00 CONSTIPATION, UNSPECIFIED CONSTIPATION TYPE: Primary | ICD-10-CM

## 2025-01-23 NOTE — PATIENT INSTRUCTIONS
"Symptoms and exam consistent with constipation.  -Take Magnesium Citrate solution 1/2 bottle along with 8 oz of water. Wait 4 hours. If no bowel movements go ahead and take the second half of the bottle with another 8 oz of water. Do not leave your home after taking Magnesium Citrate because it is likely to cause more than one bowel movent. This medication is recommended to \"clear out\" stool in moderate to severe constipation.   - Starting the following day: Recommend Miralax: 1 capful dissolved in 8 oz fluid once daily.    -Mainstay of constipation treatment is increasing fiber in  diet with fruits/vegetables, whole grain products (Metamucil supplement is also very effective). Physical activity and increasing water intake can also help produce a bowel movement.    -If pain not improving in the next 3-5 days f/u with PCP, sooner if worsening. Advised ER if there is severe abdominal pain, fever, vomiting, or any other concerning or worsening symptoms.    "

## 2025-01-23 NOTE — LETTER
January 23, 2025      Keshia Rivero  [address] [facility] [address]        To Whom It May Concern:    Keshia Rivero  was seen on 1/23/25.  Please excuse her  until 1/25/25 due to illness.        Sincerely,        YONAS Yusuf CNP    Electronically signed

## 2025-01-23 NOTE — PROGRESS NOTES
"Patient presents with:  Urgent Care: R side  abdominal pain and feels like burning   Since Sunday       Clinical Decision Making:      ICD-10-CM    1. Constipation, unspecified constipation type  K59.00       2. Right sided abdominal pain  R10.9 XR Abdomen 2 Views        Ddx considered including but not limited to cholecystitis, gallstones, liver abscess, pancreatitis, PUD, appendicitis, ureter stone, pyelonephritis, diverticulitis, bowel obstruction, and constipation. No associated vomiting, nausea, or diaphoresis and Santacruz's signs negative making cholecystitis and gallstones less likely. No fevers making liver abscess less likely. Symptoms are not consistent with eating making pancreatitis and PUD less likely. Pain is more in the right -mid abdomen versus lower abdomen and no signs of acute abdomen on physical exam making appendicitis less likely. No diarrhea and pain not in LLQ so less likely diverticulitis. No urinary symptoms or fevers making pyelonephritis less likely. Abdominal xray shows nonobstructive bowel gas pattern and patient is passing gas so less concern for bowel obstruction.  Abdominal xray does confirm moderate stool burden in cecum and ascending colon which is the location of patient's pain. History also consistent with constipation as she has not pooped for 5 days which is around the time symptoms started. Recommend constipation treatment as discussed below. Discussed red flag symptoms for when to return.       Patient Instructions   Symptoms and exam consistent with constipation.  -Take Magnesium Citrate solution 1/2 bottle along with 8 oz of water. Wait 4 hours. If no bowel movements go ahead and take the second half of the bottle with another 8 oz of water. Do not leave your home after taking Magnesium Citrate because it is likely to cause more than one bowel movent. This medication is recommended to \"clear out\" stool in moderate to severe constipation.   - Starting the following day: " Recommend Miralax: 1 capful dissolved in 8 oz fluid once daily.    -Mainstay of constipation treatment is increasing fiber in  diet with fruits/vegetables, whole grain products (Metamucil supplement is also very effective). Physical activity and increasing water intake can also help produce a bowel movement.    -If pain not improving in the next 3-5 days f/u with PCP, sooner if worsening. Advised ER if there is severe abdominal pain, fever, vomiting, or any other concerning or worsening symptoms.      HPI:  Keshia Rivero is a 69 year old female who presents today with concerns of right sided abdominal pain. Symptoms started about 5 day ago. Describes pain as a burning sensation. Ibuprofen seemed to improve the pain. Pain radiates from the front to the back. Symptoms have stayed the same. Has been eating ok. Pain does not seem to be correlated with eating. No fevers or vomiting. No bowel movements since symptoms started, typically has normal bowel movements. No urinary changes. No chest pain or shoulder pain. No urinary incontinence. No new leg weakness. Still passing gas. No dietary changes.     History obtained from the patient.    Problem List:  2025-01: Osteoporosis without current pathological fracture,   unspecified osteoporosis type  2020-10: Not immune to hepatitis B virus  2017-09: Back pain  2017-09: Varicose vein of leg  2017-09: Type 2 diabetes mellitus treated with insulin (H)  2016-12: Low back pain  2016-12: Hyperlipidemia, unspecified hyperlipidemia type  Vitamin D Deficiency  Heartburn With Regurgitation      Past Medical History:   Diagnosis Date    Fibrocystic breast        Social History     Tobacco Use    Smoking status: Never     Passive exposure: Never    Smokeless tobacco: Never   Substance Use Topics    Alcohol use: No       ROS is negative other than what is noted in HPI.       Vitals:    01/23/25 1042   BP: 140/78   Pulse: 78   Resp: 20   Temp: 98  F (36.7  C)   SpO2: 98%       Physical  Exam  Constitutional:       General: She is not in acute distress.     Appearance: She is not diaphoretic.   HENT:      Head: Normocephalic and atraumatic.      Right Ear: External ear normal.      Left Ear: External ear normal.   Eyes:      Conjunctiva/sclera: Conjunctivae normal.   Cardiovascular:      Rate and Rhythm: Normal rate and regular rhythm.      Heart sounds: Normal heart sounds. No murmur heard.  Pulmonary:      Effort: Pulmonary effort is normal. No respiratory distress.      Breath sounds: Normal breath sounds.   Abdominal:      General: Bowel sounds are normal.      Palpations: Abdomen is soft.      Tenderness: There is abdominal tenderness. There is no right CVA tenderness, left CVA tenderness or guarding.      Comments: Moderate tenderness to right mid quadrant of abdomen. Santacruz's sign negative.     Musculoskeletal:         General: Normal range of motion.   Skin:     General: Skin is warm.      Capillary Refill: Capillary refill takes less than 2 seconds.   Neurological:      General: No focal deficit present.      Mental Status: She is alert.   Psychiatric:         Mood and Affect: Mood normal.         Thought Content: Thought content normal.         Judgment: Judgment normal.         Results:  Results for orders placed or performed during the hospital encounter of 01/23/25   XR Abdomen 2 Views     Status: None    Narrative    EXAM: XR ABDOMEN 2 VIEWS  LOCATION: [facility]  DATE: 1/23/2025    INDICATION:  Right sided abdominal pain  COMPARISON: None.      Impression    IMPRESSION: Nonobstructive bowel gas pattern. Moderate stool burden within the cecum and ascending colon. No definite free air. No definite renal stone. Right-sided pelvic phlebolith.       I have personally ordered and preliminarily reviewed the following xray, I have noted stool burden in right mid abdomen with no evidence of obstruction.         At the end of the encounter, I discussed results,  diagnosis, medications. Discussed red flags for immediate return to clinic/ER, as well as indications for follow up if no improvement. Patient understood and agreed to plan. Patient was stable for discharge.    YONAS Yusuf [facility]

## 2025-01-27 ENCOUNTER — NURSE TRIAGE (OUTPATIENT)
Dept: FAMILY MEDICINE | Facility: CLINIC | Age: 70
End: 2025-01-27

## 2025-01-27 ENCOUNTER — OFFICE VISIT (OUTPATIENT)
Dept: FAMILY MEDICINE | Facility: CLINIC | Age: 70
End: 2025-01-27
Payer: MEDICARE

## 2025-01-27 VITALS
RESPIRATION RATE: 20 BRPM | HEART RATE: 74 BPM | TEMPERATURE: 98.3 F | HEIGHT: 59 IN | DIASTOLIC BLOOD PRESSURE: 68 MMHG | BODY MASS INDEX: 27.12 KG/M2 | OXYGEN SATURATION: 98 % | WEIGHT: 134.5 LBS | SYSTOLIC BLOOD PRESSURE: 134 MMHG

## 2025-01-27 DIAGNOSIS — K59.00 CONSTIPATION, UNSPECIFIED CONSTIPATION TYPE: ICD-10-CM

## 2025-01-27 DIAGNOSIS — R10.9 ABDOMINAL PAIN, UNSPECIFIED ABDOMINAL LOCATION: Primary | ICD-10-CM

## 2025-01-27 DIAGNOSIS — E11.9 TYPE 2 DIABETES MELLITUS TREATED WITH INSULIN (H): ICD-10-CM

## 2025-01-27 DIAGNOSIS — R35.0 URINARY FREQUENCY: ICD-10-CM

## 2025-01-27 DIAGNOSIS — Z79.4 TYPE 2 DIABETES MELLITUS TREATED WITH INSULIN (H): ICD-10-CM

## 2025-01-27 LAB
ALBUMIN UR-MCNC: 100 MG/DL
APPEARANCE UR: CLEAR
BACTERIA #/AREA URNS HPF: ABNORMAL /HPF
BILIRUB UR QL STRIP: NEGATIVE
COLOR UR AUTO: YELLOW
GLUCOSE UR STRIP-MCNC: 250 MG/DL
HGB UR QL STRIP: NEGATIVE
KETONES UR STRIP-MCNC: NEGATIVE MG/DL
LEUKOCYTE ESTERASE UR QL STRIP: NEGATIVE
NITRATE UR QL: NEGATIVE
PH UR STRIP: 7 [PH] (ref 5–7)
RBC #/AREA URNS AUTO: ABNORMAL /HPF
SP GR UR STRIP: 1.02 (ref 1–1.03)
SQUAMOUS #/AREA URNS AUTO: ABNORMAL /LPF
UROBILINOGEN UR STRIP-ACNC: 0.2 E.U./DL
WBC #/AREA URNS AUTO: ABNORMAL /HPF

## 2025-01-27 PROCEDURE — 99214 OFFICE O/P EST MOD 30 MIN: CPT | Performed by: FAMILY MEDICINE

## 2025-01-27 PROCEDURE — 87086 URINE CULTURE/COLONY COUNT: CPT | Performed by: FAMILY MEDICINE

## 2025-01-27 PROCEDURE — 81001 URINALYSIS AUTO W/SCOPE: CPT | Performed by: FAMILY MEDICINE

## 2025-01-27 PROCEDURE — G2211 COMPLEX E/M VISIT ADD ON: HCPCS | Performed by: FAMILY MEDICINE

## 2025-01-27 RX ORDER — DICYCLOMINE HCL 20 MG
20 TABLET ORAL EVERY 6 HOURS
Qty: 60 TABLET | Refills: 1 | Status: SHIPPED | OUTPATIENT
Start: 2025-01-27

## 2025-01-27 ASSESSMENT — ENCOUNTER SYMPTOMS: CONSTIPATION: 1

## 2025-01-27 NOTE — TELEPHONE ENCOUNTER
Nurse Triage SBAR    Is this a 2nd Level Triage? YES, LICENSED PRACTITIONER REVIEW IS REQUIRED    Situation: patient's daughter Vy calls with mother.  Patient was seen on 1/23/25 in UC for constipation. See notes. Pain is still unresolved.    Background: has been constipated and in pain for last 7 days.  Pain radiates to her back, rated 7/10.  No nausea or vomiting. Stool is soft and formed per mother.  Patient feels she has to urinate a lot more than normal, but no pain.  No fever, no diarrhea.   Assessment: constipation    Protocol Recommended Disposition:   Call ADS/Go to ED/UCC Now (Or To Office with PCP Approval)    Recommendation: await call back for further disposition   Patient and daughter would like to be seen in office.  Routed to provider    Does the patient meet one of the following criteria for ADS visit consideration? 16+ years old, with an [facility] PCP     TIP  Providers, please consider if this condition is appropriate for management at one of our Acute and Diagnostic Services sites.     If patient is a good candidate, please use dotphrase <dot>triageresponse and select Refer to ADS to document.    Reason for Disposition   MILD TO MODERATE constant pain lasting > 2 hours    Additional Information   Negative: Passed out (e.g., fainted, lost consciousness, blacked out and was not responding)   Negative: Shock suspected (e.g., cold/pale/clammy skin, too weak to stand, low BP, rapid pulse)   Negative: Sounds like a life-threatening emergency to the triager   Negative: Followed an abdomen (stomach) injury   Negative: Chest pain   Negative: Abdominal pain and pregnant < 20 weeks   Negative: Abdominal pain and pregnant 20 or more weeks   Negative: Pain is mainly in upper abdomen (if needed ask: 'is it mainly above the belly button?')   Negative: Abdomen bloating or swelling are main symptoms   Negative: SEVERE abdominal pain (e.g., excruciating)   Negative: Vomiting red blood or black (coffee ground)  "material   Negative: Blood in bowel movements  (Exception: Blood on surface of BM with constipation.)   Negative: Black or tarry bowel movements  (Exception: Chronic-unchanged black-grey BMs AND is taking iron pills or Pepto-Bismol.)    Answer Assessment - Initial Assessment Questions  1. LOCATION: \"Where does it hurt?\"       Lower abdomen  2. RADIATION: \"Does the pain shoot anywhere else?\" (e.g., chest, back)      Belly and back  3. ONSET: \"When did the pain begin?\" (e.g., minutes, hours or days ago)       Started 7 days,  4. SUDDEN: \"Gradual or sudden onset?\"      gradual  5. PATTERN \"Does the pain come and go, or is it constant?\"      On and off  6. SEVERITY: \"How bad is the pain?\"  (e.g., Scale 1-10; mild, moderate, or severe)      7  7. RECURRENT SYMPTOM: \"Have you ever had this type of stomach pain before?\" If Yes, ask: \"When was the last time?\" and \"What happened that time?\"       no  8. CAUSE: \"What do you think is causing the stomach pain?\"      Maybe constipation  9. RELIEVING/AGGRAVATING FACTORS: \"What makes it better or worse?\" (e.g., antacids, bending or twisting motion, bowel movement)      Bowel movent helped, but it is still present  10. OTHER SYMPTOMS: \"Do you have any other symptoms?\" (e.g., back pain, diarrhea, fever, urination pain, vomiting)        Stool is soft, yes to back pain, no pain in urinating but has frequency  11. PREGNANCY: \"Is there any chance you are pregnant?\" \"When was your last menstrual period?\"        no    Protocols used: Abdominal Pain - Female-A-[place]    "

## 2025-01-27 NOTE — PROGRESS NOTES
Abdominal pain, unspecified abdominal location  Right upper quadrant pain for 1 week.  Offered ultrasound or CT scan but daughter declined stating she will take patient to the ER if pain worsen.  Just requesting medication to help with the pain for now.    Constipation, unspecified constipation type  Continue medications from urgent care.    Type 2 diabetes mellitus treated with insulin (H)  Follow-up as scheduled.    Urinary frequency  Culture pending.  Treat if culture positive.  - UA Macroscopic with reflex to Microscopic and Culture - Clinic Collect  - Urine Culture Aerobic Bacterial  - UA Microscopic with Reflex to Culture     Subjective   Tram is a 69 year old, presenting for the following problem.  Daughter called the clinic this morning on behalf of the patient with a complaint of abdominal pain and constipation.  She came in today as an add-on.    Patient was seen 4 days ago at walk-in clinic due to abdominal pain and constipation.  Abdominal x-ray was unremarkable.  She was given magnesium citrate and MiraLAX.  She is having normal bowel movement with the medications she was given from urgent care but abdominal pain is not improving.  The pain started about a week ago.  The pain is from periumbilical area to the right upper quadrant and right mid back.  Denies nausea or vomiting.  No fever or chills.  She had abdominal ultrasound done a year ago which showed hepatic steatosis, no hepatic mass and small renal cysts.     She is also complaining of urinary frequency but no dysuria or blood in the urine.    No worsening diabetic control in the recent weeks.        1/27/2025     9:55 AM   Additional Questions   Roomed by Yana HDZ   Accompanied by daughter Tierra         Review of Systems  CONSTITUTIONAL: NEGATIVE for fever, chills, change in weight  ENT/MOUTH: NEGATIVE for ear, mouth and throat problems  RESP: NEGATIVE for significant cough or SOB  CV: NEGATIVE for chest pain/chest pressure      Objective   "  Vitals:    01/27/25 0956   BP: 134/68   Pulse: 74   Resp: 20   Temp: 98.3  F (36.8  C)   TempSrc: Oral   SpO2: 98%   Weight: 61 kg (134 lb 8 oz)   Height: 1.51 m (4' 11.45\")      Physical Exam   GENERAL: alert and no distress  NECK: Supple  RESP: lungs clear to auscultation - no rales, rhonchi or wheezes  CV: regular rates and rhythm and normal S1 S2, no S3 or S4  ABDOMEN: bowel sounds normal, tenderness in the right upper quadrant area.   PSYCH: mentation appears normal    This transcription uses voice recognition software, which may contain typographical errors.    The longitudinal plan of care for the diagnosis(es)/condition(s) as documented were addressed during this visit. Due to the added complexity in care, I will continue to support Tram in the subsequent management and with ongoing continuity of care.          Signed Electronically by: Charles Eddy MD    "

## 2025-01-27 NOTE — TELEPHONE ENCOUNTER
Ok to use my available double book slot this morning at 10:00 am if patient can make it. Otherwise urgent care today.    Pleae call the patient.    Dr. Charles Eddy     Called patient a appt scheduled.      SARA Crawford CemN RN  [facility]

## 2025-01-28 LAB — BACTERIA UR CULT: NORMAL

## 2025-01-29 ENCOUNTER — TELEPHONE (OUTPATIENT)
Dept: FAMILY MEDICINE | Facility: CLINIC | Age: 70
End: 2025-01-29
Payer: MEDICARE

## 2025-01-29 NOTE — TELEPHONE ENCOUNTER
----- Message from Charles Eddy sent at 1/29/2025  4:03 PM CST -----  Urine culture did not show infection. She does not need antibiotic. Please call the patient.    Dr. Charles Eddy  1/29/2025 4:02 PM

## 2025-01-29 NOTE — TELEPHONE ENCOUNTER
Writer attempt #1 to call patient regarding clinician's message below. Clinician's message relayed to patient's daughter, Tierra Mendoza (CTC on file).    Patient verbalizes understanding, agrees with plan and has no further questions.    SARA NavaN, RN, PHN   [facility]

## 2025-06-29 DIAGNOSIS — R42 DIZZINESS: ICD-10-CM

## 2025-06-29 RX ORDER — MECLIZINE HYDROCHLORIDE 25 MG/1
25 TABLET ORAL 3 TIMES DAILY PRN
Qty: 90 TABLET | Refills: 5 | Status: SHIPPED | OUTPATIENT
Start: 2025-06-29

## 2025-07-03 ENCOUNTER — OFFICE VISIT (OUTPATIENT)
Dept: FAMILY MEDICINE | Facility: CLINIC | Age: 70
End: 2025-07-03
Payer: MEDICARE

## 2025-07-03 VITALS
BODY MASS INDEX: 26.06 KG/M2 | HEIGHT: 60 IN | SYSTOLIC BLOOD PRESSURE: 118 MMHG | OXYGEN SATURATION: 99 % | TEMPERATURE: 99.1 F | DIASTOLIC BLOOD PRESSURE: 68 MMHG | WEIGHT: 132.75 LBS | HEART RATE: 72 BPM | RESPIRATION RATE: 18 BRPM

## 2025-07-03 DIAGNOSIS — Z00.00 ENCOUNTER FOR MEDICARE ANNUAL WELLNESS EXAM: Primary | ICD-10-CM

## 2025-07-03 DIAGNOSIS — Z79.4 TYPE 2 DIABETES MELLITUS TREATED WITH INSULIN (H): ICD-10-CM

## 2025-07-03 DIAGNOSIS — E11.9 TYPE 2 DIABETES MELLITUS TREATED WITH INSULIN (H): ICD-10-CM

## 2025-07-03 LAB
ANION GAP SERPL CALCULATED.3IONS-SCNC: 9 MMOL/L (ref 7–15)
BUN SERPL-MCNC: 9.3 MG/DL (ref 8–23)
CALCIUM SERPL-MCNC: 9.6 MG/DL (ref 8.8–10.4)
CHLORIDE SERPL-SCNC: 104 MMOL/L (ref 98–107)
CHOLEST SERPL-MCNC: 131 MG/DL
CREAT SERPL-MCNC: 0.55 MG/DL (ref 0.51–0.95)
EGFRCR SERPLBLD CKD-EPI 2021: >90 ML/MIN/1.73M2
EST. AVERAGE GLUCOSE BLD GHB EST-MCNC: 186 MG/DL
FASTING STATUS PATIENT QL REPORTED: YES
FASTING STATUS PATIENT QL REPORTED: YES
GLUCOSE SERPL-MCNC: 130 MG/DL (ref 70–99)
HBA1C MFR BLD: 8.1 % (ref 0–5.6)
HCO3 SERPL-SCNC: 28 MMOL/L (ref 22–29)
HDLC SERPL-MCNC: 48 MG/DL
LDLC SERPL CALC-MCNC: 55 MG/DL
NONHDLC SERPL-MCNC: 83 MG/DL
POTASSIUM SERPL-SCNC: 4.8 MMOL/L (ref 3.4–5.3)
SODIUM SERPL-SCNC: 141 MMOL/L (ref 135–145)
TRIGL SERPL-MCNC: 142 MG/DL

## 2025-07-03 SDOH — HEALTH STABILITY: PHYSICAL HEALTH: ON AVERAGE, HOW MANY DAYS PER WEEK DO YOU ENGAGE IN MODERATE TO STRENUOUS EXERCISE (LIKE A BRISK WALK)?: 3 DAYS

## 2025-07-03 SDOH — HEALTH STABILITY: PHYSICAL HEALTH: ON AVERAGE, HOW MANY MINUTES DO YOU ENGAGE IN EXERCISE AT THIS LEVEL?: 20 MIN

## 2025-07-03 ASSESSMENT — SOCIAL DETERMINANTS OF HEALTH (SDOH): HOW OFTEN DO YOU GET TOGETHER WITH FRIENDS OR RELATIVES?: ONCE A WEEK

## 2025-07-03 NOTE — PROGRESS NOTES
Preventive Care Visit  [facility] BEATRIZ Eddy MD, Family Medicine  Jul 3, 2025        Encounter for Medicare annual wellness exam      Type 2 diabetes mellitus treated with insulin (H)  Declined to discuss diabetes management today  - Hemoglobin A1c  - Basic metabolic panel  - Lipid Profile      Subjective   Tram is a 70 year old, presenting for the following:  Physical    Does not want to discuss diabetes and other medical conditions today.        7/3/2025     8:24 AM   Additional Questions   Roomed by Armando HDZ   Accompanied by daughter        Advance Care Planning    Discussed advance care planning with patient; however, patient declined at this time.        7/3/2025   General Health   How would you rate your overall physical health? Good   Feel stress (tense, anxious, or unable to sleep) Not at all         7/3/2025   Nutrition   Diet: Carbohydrate counting         7/3/2025   Exercise   Days per week of moderate/strenous exercise 3 days   Average minutes spent exercising at this level 20 min         7/3/2025   Social Factors   Frequency of gathering with friends or relatives Once a week   Worry food won't last until get money to buy more No   Food not last or not have enough money for food? No   Do you have housing? (Housing is defined as stable permanent housing and does not include staying outside in a car, in a tent, in an abandoned building, in an overnight shelter, or couch-surfing.) Yes   Are you worried about losing your housing? No   Lack of transportation? No   Unable to get utilities (heat,electricity)? No         7/3/2025   Fall Risk   Fallen 2 or more times in the past year? No   Trouble with walking or balance? No          7/3/2025   Activities of Daily Living- Home Safety   Needs help with the following daily activites None of the above   Safety concerns in the home None of the above         7/3/2025   Dental   Dentist two times every year? (!) NO         7/3/2025   Hearing  Screening   Hearing concerns? None of the above         7/3/2025   Driving Risk Screening   Patient/family members have concerns about driving (!) DECLINE         7/3/2025   General Alertness/Fatigue Screening   Have you been more tired than usual lately? No         7/3/2025   Urinary Incontinence Screening   Bothered by leaking urine in past 6 months No         Today's PHQ-2 Score:       7/3/2025     8:24 AM   PHQ-2 ( 1999 Pf[employer])   Q1: Little interest or pleasure in doing things 0    Q2: Feeling down, depressed or hopeless 0    PHQ-2 Score 0    Q1: Little interest or pleasure in doing things Not at all   Q2: Feeling down, depressed or hopeless Not at all   PHQ-2 Score 0       Proxy-reported           7/3/2025   Substance Use   Alcohol more than 3/day or more than 7/wk No   Do you have a current opioid prescription? No   How severe/bad is pain from 1 to 10? 0/10 (No Pain)   Do you use any other substances recreationally? No     Social History     Tobacco Use    Smoking status: Never     Passive exposure: Never    Smokeless tobacco: Never   Vaping Use    Vaping status: Never Used   Substance Use Topics    Alcohol use: No    Drug use: No           2/15/2024   LAST FHS-7 RESULTS   1st degree relative breast or ovarian cancer No   Any relative bilateral breast cancer No   Any male have breast cancer No   Any ONE woman have BOTH breast AND ovarian cancer No   Any woman with breast cancer before 50yrs No   2 or more relatives with breast AND/OR ovarian cancer No   2 or more relatives with breast AND/OR bowel cancer No        Mammogram Screening - Mammogram every 1-2 years updated in Health Maintenance based on mutual decision making      History of abnormal Pap smear: No - age 65 or older with pap indicated due to inadequate prior screening (3 consecutive negative cytology results, 2 consecutive negative cotesting results, or 2 consecutive negative HrHPV test results within 10 years, with the most recent test occurring  within the recommended screening interval for the test used)        Latest Ref Rng & Units 12/24/2020     9:08 AM 11/12/2015     9:00 AM   PAP / HPV   PAP Negative for squamous intraepithelial lesion or malignancy. Negative for squamous intraepithelial lesion or malignancy  Electronically signed by Bhumi Corral CT (ASCP) on 1/6/2021 at  1:49 PM    Negative for squamous intraepithelial lesion or malignancy  Electronically signed by Bhumi Corral CT (ASCP) on 11/25/2015 at  2:36 PM      HPV 16 DNA NEG Negative     HPV 18 DNA NEG Negative     Other HR HPV NEG Negative       ASCVD Risk   The 10-year ASCVD risk score (Kieran ARRIAGA, et al., 2019) is: 16.4%    Values used to calculate the score:      Age: 70 years      Sex: Female      Is Non- : No      Diabetic: Yes      Tobacco smoker: No      Systolic Blood Pressure: 134 mmHg      Is BP treated: No      HDL Cholesterol: 51 mg/dL      Total Cholesterol: 218 mg/dL          Reviewed and updated as needed this visit by Provider                  Current providers sharing in care for this patient include:  Patient Care Team:  Charles Eddy MD as PCP - General (Family Medicine)  Gabriella Stevenson, PharmD as Pharmacist (Pharmacist)  Charles Eddy MD as Assigned PCP    The following health maintenance items are reviewed in Epic and correct as of today:  Health Maintenance   Topic Date Due    ZOSTER VACCINE (1 of 2) Never done    RSV VACCINE (1 - Risk 60-74 years 1-dose series) Never done    COVID-19 VACCINE (4 - 2024-25 season) 09/01/2024    MEDICARE ANNUAL WELLNESS VISIT  09/19/2024    PNEUMOCOCCAL VACCINE 50+ YEARS (3 of 3 - PCV20 or PCV21) 11/21/2024    A1C  07/02/2025    ANNUAL REVIEW OF HM ORDERS  01/02/2026    INFLUENZA VACCINE (1) 09/01/2025    DTAP/TDAP/TD VACCINE (2 - Td or Tdap) 11/12/2025    EYE EXAM  12/09/2025    COLORECTAL CANCER SCREENING  12/14/2025    BMP  01/02/2026    LIPID  01/02/2026    MICROALBUMIN  01/02/2026     "DIABETIC FOOT EXAM  01/02/2026    MAMMO SCREENING  02/15/2026    FALL RISK ASSESSMENT  07/03/2026    ADVANCE CARE PLANNING  09/19/2028    DEXA  08/29/2037    HEPATITIS C SCREENING  Completed    PHQ-2 (once per calendar year)  Completed    HPV VACCINE  Aged Out    MENINGITIS VACCINE  Aged Out    PAP  Discontinued         Review of Systems  CONSTITUTIONAL: NEGATIVE for fever, chills, change in weight  ENT/MOUTH: NEGATIVE for ear, mouth and throat problems  RESP: NEGATIVE for significant cough or SOB  CV: NEGATIVE for chest pain, palpitations or peripheral edema     Objective    Exam  There were no vitals taken for this visit.   Estimated body mass index is 26.76 kg/m  as calculated from the following:    Height as of 1/27/25: 1.51 m (4' 11.45\").    Weight as of 1/27/25: 61 kg (134 lb 8 oz).  Vitals:    07/03/25 0830   BP: 118/68   Pulse: 72   Resp: 18   Temp: 99.1  F (37.3  C)   TempSrc: Oral   SpO2: 99%   Weight: 60.2 kg (132 lb 12 oz)   Height: 1.511 m (4' 11.5\")      Physical Exam  GENERAL: alert and no distress  NECK: Supple  RESP: lungs clear to auscultation - no rales, rhonchi or wheezes  CV: regular rates and rhythm and no murmur, click or rub  PSYCH: mentation appears normal        7/3/2025   Mini Cog   Clock Draw Score 2 Normal   3 Item Recall 3 objects recalled   Mini Cog Total Score 5              Signed Electronically by: Charles Eddy MD    "

## 2025-07-03 NOTE — PATIENT INSTRUCTIONS
Patient Education   Preventive Care Advice   This is general advice given by our system to help you stay healthy. However, your care team may have specific advice just for you. Please talk to your care team about your preventive care needs.  Nutrition  Eat 5 or more servings of fruits and vegetables each day.  Try wheat bread, brown rice and whole grain pasta (instead of white bread, rice, and pasta).  Get enough calcium and vitamin D. Check the label on foods and aim for 100% of the RDA (recommended daily allowance).  Lifestyle  Exercise at least 150 minutes each week  (30 minutes a day, 5 days a week).  Do muscle strengthening activities 2 days a week. These help control your weight and prevent disease.  No smoking.  Wear sunscreen to prevent skin cancer.  Have a dental exam and cleaning every 6 months.  Yearly exams  See your health care team every year to talk about:  Any changes in your health.  Any medicines your care team has prescribed.  Preventive care, family planning, and ways to prevent chronic diseases.  Shots (vaccines)   HPV shots (up to age 26), if you've never had them before.  Hepatitis B shots (up to age 59), if you've never had them before.  COVID-19 shot: Get this shot when it's due.  Flu shot: Get a flu shot every year.  Tetanus shot: Get a tetanus shot every 10 years.  Pneumococcal, hepatitis A, and RSV shots: Ask your care team if you need these based on your risk.  Shingles shot (for age 50 and up)  General health tests  Diabetes screening:  Starting at age 35, Get screened for diabetes at least every 3 years.  If you are younger than age 35, ask your care team if you should be screened for diabetes.  Cholesterol test: At age 39, start having a cholesterol test every 5 years, or more often if advised.  Bone density scan (DEXA): At age 50, ask your care team if you should have this scan for osteoporosis (brittle bones).  Hepatitis C: Get tested at least once in your life.  STIs (sexually  transmitted infections)  Before age 24: Ask your care team if you should be screened for STIs.  After age 24: Get screened for STIs if you're at risk. You are at risk for STIs (including HIV) if:  You are sexually active with more than one person.  You don't use condoms every time.  You or a partner was diagnosed with a sexually transmitted infection.  If you are at risk for HIV, ask about PrEP medicine to prevent HIV.  Get tested for HIV at least once in your life, whether you are at risk for HIV or not.  Cancer screening tests  Cervical cancer screening: If you have a cervix, begin getting regular cervical cancer screening tests starting at age 21.  Breast cancer scan (mammogram): If you've ever had breasts, begin having regular mammograms starting at age 40. This is a scan to check for breast cancer.  Colon cancer screening: It is important to start screening for colon cancer at age 45.  Have a colonoscopy test every 10 years (or more often if you're at risk) Or, ask your provider about stool tests like a FIT test every year or Cologuard test every 3 years.  To learn more about your testing options, visit:   .  For help making a decision, visit:   https://bit.ly/os64372.  Prostate cancer screening test: If you have a prostate, ask your care team if a prostate cancer screening test (PSA) at age 55 is right for you.  Lung cancer screening: If you are a current or former smoker ages 50 to 80, ask your care team if ongoing lung cancer screenings are right for you.  For informational purposes only. Not to replace the advice of your health care provider. Copyright   2023 [employer] All rights reserved. Clinically reviewed by the [facility]. [employer] 326630 - REV 01/24.

## 2025-07-12 DIAGNOSIS — M81.0 OSTEOPOROSIS, UNSPECIFIED OSTEOPOROSIS TYPE, UNSPECIFIED PATHOLOGICAL FRACTURE PRESENCE: ICD-10-CM

## 2025-07-14 RX ORDER — ALENDRONATE SODIUM 70 MG/1
TABLET ORAL
Qty: 12 TABLET | Refills: 0 | Status: SHIPPED | OUTPATIENT
Start: 2025-07-14